# Patient Record
Sex: FEMALE | Race: OTHER | Employment: FULL TIME | URBAN - METROPOLITAN AREA
[De-identification: names, ages, dates, MRNs, and addresses within clinical notes are randomized per-mention and may not be internally consistent; named-entity substitution may affect disease eponyms.]

---

## 2020-08-28 ENCOUNTER — HOSPITAL ENCOUNTER (OUTPATIENT)
Age: 45
Setting detail: SPECIMEN
Discharge: HOME | End: 2020-08-28
Payer: COMMERCIAL

## 2020-08-28 ENCOUNTER — OFFICE VISIT (OUTPATIENT)
Dept: OBGYN CLINIC | Age: 45
End: 2020-08-28
Payer: COMMERCIAL

## 2020-08-28 VITALS
DIASTOLIC BLOOD PRESSURE: 82 MMHG | SYSTOLIC BLOOD PRESSURE: 123 MMHG | HEART RATE: 69 BPM | TEMPERATURE: 98.3 F | WEIGHT: 122.8 LBS

## 2020-08-28 PROBLEM — N83.8 LEFT OVARIAN ENLARGEMENT: Status: ACTIVE | Noted: 2020-08-28

## 2020-08-28 PROBLEM — N92.6 IRREGULAR BLEEDING: Status: ACTIVE | Noted: 2020-08-28

## 2020-08-28 PROBLEM — N94.6 DYSMENORRHEA: Status: ACTIVE | Noted: 2020-08-28

## 2020-08-28 PROBLEM — Z12.31 SCREENING MAMMOGRAM, ENCOUNTER FOR: Status: ACTIVE | Noted: 2020-08-28

## 2020-08-28 PROBLEM — Z01.419 WELL WOMAN EXAM: Status: ACTIVE | Noted: 2020-08-28

## 2020-08-28 PROBLEM — D28.0 NEVUS OF LABIA MAJORA: Status: ACTIVE | Noted: 2020-08-28

## 2020-08-28 PROCEDURE — 99386 PREV VISIT NEW AGE 40-64: CPT | Performed by: SPECIALIST

## 2020-08-28 ASSESSMENT — ENCOUNTER SYMPTOMS
CONSTIPATION: 0
ABDOMINAL PAIN: 0
NAUSEA: 0
APNEA: 0
VOMITING: 0
COUGH: 0
ABDOMINAL DISTENTION: 0
DIARRHEA: 0
EYE PAIN: 0

## 2020-08-28 NOTE — PROGRESS NOTES
Question:   Collection Type     Answer: Thin Prep     Order Specific Question:   Prior Abnormal Pap Test     Answer:   No     Order Specific Question:   Screening or Diagnostic     Answer:   Screening     Order Specific Question:   HPV Requested? Answer:   Yes     Order Specific Question:   High Risk Patient     Answer:   N/A        New patient is here for an annual exam. Patient is originally from [facility] and moved here from [place] about 7 or 8 years ago. Patient states that she does monthly self breast exams. Patient complains that she is having irregular periods with cramping. Review of Systems   Constitutional: Negative for activity change, appetite change and fever. HENT: Negative for ear discharge and ear pain. Eyes: Negative for pain and visual disturbance. Respiratory: Negative for apnea and cough. Cardiovascular: Negative for chest pain, palpitations and leg swelling. Gastrointestinal: Negative for abdominal distention, abdominal pain, constipation, diarrhea, nausea and vomiting. Endocrine: Negative. Genitourinary: Positive for menstrual problem and pelvic pain. Negative for difficulty urinating and dysuria. Musculoskeletal: Negative for neck pain and neck stiffness. Skin: Negative. Neurological: Negative for light-headedness and numbness. Hematological: Negative. Does not bruise/bleed easily. Objective:   Physical Exam  Vitals signs and nursing note reviewed. Exam conducted with a chaperone present. Constitutional:       Appearance: She is well-developed. HENT:      Head: Normocephalic and atraumatic. Neck:      Thyroid: No thyroid mass or thyromegaly. Cardiovascular:      Rate and Rhythm: Normal rate and regular rhythm. Pulmonary:      Effort: Pulmonary effort is normal.      Breath sounds: Normal breath sounds. Chest:      Breasts:         Right: Normal. No inverted nipple, mass, nipple discharge, skin change or tenderness.          Left: Normal. No inverted nipple, mass, nipple discharge, skin change or tenderness. Abdominal:      General: Bowel sounds are normal. There is no distension. Palpations: Abdomen is soft. There is no mass. Tenderness: There is no abdominal tenderness. There is no guarding or rebound. Hernia: There is no hernia in the left inguinal area. Genitourinary:     General: Normal vulva. Exam position: Supine. Labia:         Right: Lesion present. No rash. Left: Lesion present. No rash. Vagina: Normal. No signs of injury. No vaginal discharge or tenderness. Cervix: Normal.      Uterus: Normal. Not enlarged, not fixed and not tender. Adnexa: Right adnexa normal.        Right: No mass or tenderness. Left: Mass present. No tenderness. Rectum: Normal. No mass or anal fissure. Normal anal tone. Comments: Mid labia majora 1 cm nevus with another one on the other side. Musculoskeletal: Normal range of motion. General: No tenderness. Skin:     General: Skin is warm and dry. Neurological:      Mental Status: She is alert and oriented to person, place, and time. Psychiatric:         Judgment: Judgment normal.         Assessment:      Patient with bilateral nevus of the labia majora and enlargement, possible mass of left adnexa found on annual exam.  Pap smear was done. Patient advised to have mammogram done. Patient encouraged to continue monthly self breast awareness exams. Patient with irregular bleeding and dysmenorrhea with enlargement of the left adnexa. Will evaluate with ultrasound. Patient was advised to return in 1 month. At that time, all results from ultrasound, mammogram and pap smear will be available and will discuss scheduling biopsy of nevi. Explained to patient that vulvar nevi have a tendency to become melanoma and biopsy is strongly recommended.         Plan:      Orders Placed This Encounter   Procedures    ALYSSA DIGITAL SCREEN W OR WO CAD BILATERAL    US PELVIS COMPLETE    US NON OB TRANSVAGINAL    PAP SMEAR     Appointment for ultrasound  Appointment in 1 month    IEssie am scribing for, and in the presence of Dr. Darvin Haynes. Electronically signed by: Essie Monroy 8/28/20 10:31 AM       I agree to the above documentation placed by my scribe Essie Monroy. I reviewed the scribe's note and agree with the documented findings and plan of care. Any areas of disagreement are noted on the chart. I have personally evaluated this patient. Additional findings are as noted. I agree with the chief complaint, past medical history, past surgical history, allergies, medications, social and family history as documented unless otherwise noted below.      Electronically signed by Darvin Haynes MD on 8/29/2020 at 4:55 PM

## 2020-09-02 LAB
HPV SAMPLE: NORMAL
HPV, GENOTYPE 16: NOT DETECTED
HPV, GENOTYPE 18: NOT DETECTED
HPV, HIGH RISK OTHER: NOT DETECTED
HPV, INTERPRETATION: NORMAL
SPECIMEN DESCRIPTION: NORMAL

## 2020-09-03 LAB — CYTOLOGY REPORT: NORMAL

## 2020-09-25 ENCOUNTER — OFFICE VISIT (OUTPATIENT)
Dept: OBGYN CLINIC | Age: 45
End: 2020-09-25
Payer: COMMERCIAL

## 2020-09-25 VITALS
TEMPERATURE: 98 F | BODY MASS INDEX: 21.79 KG/M2 | WEIGHT: 123 LBS | HEIGHT: 63 IN | DIASTOLIC BLOOD PRESSURE: 83 MMHG | HEART RATE: 85 BPM | SYSTOLIC BLOOD PRESSURE: 127 MMHG

## 2020-09-25 PROBLEM — N85.2 ENLARGED UTERUS: Status: ACTIVE | Noted: 2020-09-25

## 2020-09-25 PROBLEM — D21.9 FIBROIDS: Status: ACTIVE | Noted: 2020-09-25

## 2020-09-25 PROBLEM — N92.0 MENORRHAGIA WITH REGULAR CYCLE: Status: ACTIVE | Noted: 2020-09-25

## 2020-09-25 PROCEDURE — 99213 OFFICE O/P EST LOW 20 MIN: CPT | Performed by: SPECIALIST

## 2020-09-25 ASSESSMENT — ENCOUNTER SYMPTOMS
VOMITING: 0
APNEA: 0
DIARRHEA: 0
ABDOMINAL PAIN: 0
EYE PAIN: 0
COUGH: 0
NAUSEA: 0
CONSTIPATION: 0
ABDOMINAL DISTENTION: 0

## 2020-09-25 NOTE — PROGRESS NOTES
Neurological: Negative for light-headedness and numbness. Hematological: Negative. Does not bruise/bleed easily. Objective:   Physical Exam  Vitals signs and nursing note reviewed. Constitutional:       Appearance: She is well-developed. HENT:      Head: Normocephalic and atraumatic. Neck:      Musculoskeletal: Normal range of motion and neck supple. Thyroid: No thyromegaly. Cardiovascular:      Rate and Rhythm: Normal rate and regular rhythm. Pulmonary:      Effort: Pulmonary effort is normal.      Breath sounds: Normal breath sounds. No wheezing. Abdominal:      General: Bowel sounds are normal. There is no distension. Palpations: Abdomen is soft. There is no mass. Tenderness: There is no abdominal tenderness. There is no guarding. Musculoskeletal: Normal range of motion. Skin:     General: Skin is dry. Neurological:      Mental Status: She is alert and oriented to person, place, and time. Psychiatric:         Behavior: Behavior normal.         Thought Content: Thought content normal.         Assessment:      Patient with irregular bleeding and adnexal enlargement on previous exam.  Ultrasound showing enlarged uterus with multiple fibroids was reviewed and explained to patient. Treatment options were discussed. Patient requests hysterectomy. Will schedule patient for LAVH. As patient has bilateral labia nevi, biopsy of vulva will be done at time of surgery due to high risk of melanoma. Discussed surgical procedure, risks and benefits, and all questions were answered. Plan:      Schedule for LAVH with vulvar biopsy of labial nevi. Davie Paulino am scribing for, and in the presence of Dr. Nawaf Mosley. Electronically signed by: Prateek Villareal 9/25/20 10:36 AM       I agree to the above documentation placed by my scribe Prateek Villareal. I reviewed the scribe's note and agree with the documented findings and plan of care.  Any areas of disagreement are noted on the chart. I have personally evaluated this patient. Additional findings are as noted. I agree with the chief complaint, past medical history, past surgical history, allergies, medications, social and family history as documented unless otherwise noted below.      Electronically signed by Darren Cavazos MD on 9/27/2020 at 6:45 PM

## 2020-09-27 PROBLEM — Z12.31 SCREENING MAMMOGRAM, ENCOUNTER FOR: Status: RESOLVED | Noted: 2020-08-28 | Resolved: 2020-09-27

## 2020-10-01 ENCOUNTER — TELEPHONE (OUTPATIENT)
Dept: OBGYN CLINIC | Age: 45
End: 2020-10-01

## 2020-10-13 ENCOUNTER — TELEPHONE (OUTPATIENT)
Dept: OBGYN CLINIC | Age: 45
End: 2020-10-13

## 2020-10-13 NOTE — TELEPHONE ENCOUNTER
Writer called patient daughter to inform that fmla papers were completed.   She instructed me to fax to employer and she will be in to  documents later

## 2020-10-15 ENCOUNTER — HOSPITAL ENCOUNTER (OUTPATIENT)
Dept: PREADMISSION TESTING | Age: 45
Discharge: HOME | End: 2020-10-19
Payer: COMMERCIAL

## 2020-10-15 VITALS
WEIGHT: 122 LBS | TEMPERATURE: 97.9 F | RESPIRATION RATE: 20 BRPM | DIASTOLIC BLOOD PRESSURE: 82 MMHG | SYSTOLIC BLOOD PRESSURE: 140 MMHG | HEART RATE: 81 BPM | HEIGHT: 63 IN | OXYGEN SATURATION: 99 % | BODY MASS INDEX: 21.62 KG/M2

## 2020-10-15 LAB
ABO/RH: NORMAL
ABSOLUTE EOS #: 0.1 K/UL (ref 0–0.4)
ABSOLUTE IMMATURE GRANULOCYTE: ABNORMAL K/UL (ref 0–0.3)
ABSOLUTE LYMPH #: 2 K/UL (ref 1–4.8)
ABSOLUTE MONO #: 0.7 K/UL (ref 0.1–1.3)
ANTIBODY SCREEN: NEGATIVE
ARM BAND NUMBER: NORMAL
BASOPHILS # BLD: 0 % (ref 0–2)
BASOPHILS ABSOLUTE: 0 K/UL (ref 0–0.2)
DIFFERENTIAL TYPE: ABNORMAL
EOSINOPHILS RELATIVE PERCENT: 1 % (ref 0–4)
EXPIRATION DATE: NORMAL
HCT VFR BLD CALC: 35.6 % (ref 36–46)
HEMOGLOBIN: 11.9 G/DL (ref 12–16)
IMMATURE GRANULOCYTES: ABNORMAL %
LYMPHOCYTES # BLD: 19 % (ref 24–44)
MCH RBC QN AUTO: 28.6 PG (ref 26–34)
MCHC RBC AUTO-ENTMCNC: 33.4 G/DL (ref 31–37)
MCV RBC AUTO: 85.4 FL (ref 80–100)
MONOCYTES # BLD: 6 % (ref 1–7)
NRBC AUTOMATED: ABNORMAL PER 100 WBC
PDW BLD-RTO: 17 % (ref 11.5–14.9)
PLATELET # BLD: 298 K/UL (ref 150–450)
PLATELET ESTIMATE: ABNORMAL
PMV BLD AUTO: 9.2 FL (ref 6–12)
RBC # BLD: 4.16 M/UL (ref 4–5.2)
RBC # BLD: ABNORMAL 10*6/UL
SEG NEUTROPHILS: 74 % (ref 36–66)
SEGMENTED NEUTROPHILS ABSOLUTE COUNT: 8.1 K/UL (ref 1.3–9.1)
WBC # BLD: 10.9 K/UL (ref 3.5–11)
WBC # BLD: ABNORMAL 10*3/UL

## 2020-10-15 PROCEDURE — 86900 BLOOD TYPING SEROLOGIC ABO: CPT

## 2020-10-15 PROCEDURE — 36415 COLL VENOUS BLD VENIPUNCTURE: CPT

## 2020-10-15 PROCEDURE — 86850 RBC ANTIBODY SCREEN: CPT

## 2020-10-15 PROCEDURE — 86901 BLOOD TYPING SEROLOGIC RH(D): CPT

## 2020-10-15 PROCEDURE — 85025 COMPLETE CBC W/AUTO DIFF WBC: CPT

## 2020-10-15 RX ORDER — IBUPROFEN 600 MG/1
600 TABLET ORAL EVERY 6 HOURS PRN
Status: ON HOLD | COMMUNITY
End: 2020-10-30 | Stop reason: HOSPADM

## 2020-10-15 NOTE — H&P
HISTORY and Angela Mcghee [id number]       NAME:  Bard Manjarrez  MRN: [id number]   YOB: 1975   Date: 10/15/2020   Age: 40 y.o. Gender: female       COMPLAINT AND PRESENT HISTORY:     Bard Manjarrez is 40 y.o.,  female, preadmission testing for HYSTERECTOMY VAGINAL LAPAROSCOPIC ASSISTED (LAVH). Patient has hx of  Irregular bleeding,  Dysmenorrhea and Left ovarian enlargement  Patient is non [place] speaking and a Dominican   service was used to obtain information from patient. Patient has  Menorrhagia with heavy flow lasting for 7 days. patient states that she has to change her pads often due to overflow. Patient reports having monthly menses and at times 2 within the month. This was associated with lower abdominal/ pelvic pains and cramping that radiated her back. Sometimes blood clots are present in the flow. patient states the pain is severe rating her pain at greater than 10/10. patient reports that is not sexually active. no vaginal discharge, sores or itching, but admits to foul  urine odor \" smells  Rotten\"  Patient has hx of ovary removal done on the right. And hx of fibroid of uterus. Patient states that she was on BCP 3 yrs ago that helped with her pain and since being off it, the pain has been persistent. Symptoms started long ago with her menses, but has worse since 3 years ago. Patient states she has one child. .  Pt is not on any hormones at this time. No burning on micturition. No hesitancy, urgency, frequency or discoloration of urine. No current pelvic pain. No fever or chills.     PAST MEDICAL HISTORY     Past Medical History:   Diagnosis Date    Enlarged uterus     Fibroid uterus     Irregular bleeding     Non-English speaking patient     Kyrgyz speaking    Pelvic pain          SURGICAL HISTORY       Past Surgical History:   Procedure Laterality Date    BREAST ENHANCEMENT SURGERY      OVARY REMOVAL Right     SALPINGECTOMY FAMILY HISTORY     History reviewed. No pertinent family history. SOCIAL HISTORY       Social History     Socioeconomic History    Marital status: Single     Spouse name: None    Number of children: None    Years of education: None    Highest education level: None   Occupational History    None   Social Needs    Financial resource strain: None    Food insecurity     Worry: None     Inability: None    Transportation needs     Medical: None     Non-medical: None   Tobacco Use    Smoking status: Current Every Day Smoker     Packs/day: 1.00    Smokeless tobacco: Never Used   Substance and Sexual Activity    Alcohol use: No    Drug use: No    Sexual activity: Never   Lifestyle    Physical activity     Days per week: None     Minutes per session: None    Stress: None   Relationships    Social connections     Talks on phone: None     Gets together: None     Attends Cheondoism service: None     Active member of club or organization: None     Attends meetings of clubs or organizations: None     Relationship status: None    Intimate partner violence     Fear of current or ex partner: None     Emotionally abused: None     Physically abused: None     Forced sexual activity: None   Other Topics Concern    None   Social History Narrative    None           REVIEW OF SYSTEMS      No Known Allergies    Current Outpatient Medications on File Prior to Encounter   Medication Sig Dispense Refill    ibuprofen (ADVIL;MOTRIN) 600 MG tablet Take 600 mg by mouth every 6 hours as needed for Pain       No current facility-administered medications on file prior to encounter. General health:  Fairly good. No fever or chills. Skin:  No itching, redness or rash. HEENT:  No headache, epistaxis or sore throat. Neck:  No pain, stiffness or masses. Cardiovascular/Respiratory system:  No chest pain, palpitation or shortness of breath. Gastrointestinal tract: No abdominal pain, Dysphagia, nausea, vomiting, diarrhea or constipation. Genitourinary:   See HPI    Locomotor:  No bone or joint pains. No swelling. Neuropsychiatric:  No referable complaints. GENERAL PHYSICAL EXAM:     Vitals: BP (!) 140/82   Pulse 81   Temp 97.9 °F (36.6 °C)   Resp 20   Ht 5' 3\" (1.6 m)   Wt 122 lb (55.3 kg)   LMP 09/19/2020 (Exact Date)   SpO2 99%   BMI 21.61 kg/m²  Body mass index is 21.61 kg/m². GENERAL APPEARANCE:   Corine Swenson is 40 y.o.,  female, not obese, nourished, conscious, alert. Does not appear to be distress or pain at this time. SKIN:  Warm, dry, no cyanosis or jaundice. HEAD:  Normocephalic, atraumatic, no swelling or tenderness. EYES:  Pupils equal, reactive to light. EARS:  No discharge, no marked hearing loss. NOSE:  No rhinorrhea, epistaxis or septal deformity. THROAT:  Not congested. No ulceration bleeding or discharge. NECK:  No stiffness, trachea central.                  CHEST:  Symmetrical and equal on expansion. HEART:  RRR S1 > S2. No audible murmurs or gallops. LUNGS:  Equal on expansion, normal breath sounds. No adventitious sounds. ABDOMEN:  Soft on palpation. No localized tenderness. No guarding or rigidity. No palpable hepatosplenomegaly. LYMPHATICS:  No palpable cervical lymphadenopathy. LOCOMOTOR, BACK AND SPINE:  No tenderness or deformities. EXTREMITIES:  Symmetrical, no pretibial edema. Arnauds sign negative or no calf tenderness. No discoloration or ulcerations. NEUROLOGIC:  The patient is conscious, alert, oriented,Cranial nerve II-XII intact, taste and smell were not examined. No apparent focal sensory or motor deficits. PROVISIONAL DIAGNOSES / SURGERY:       Irregular bleeding      Dysmenorrhea      Left ovarian enlargement    HYSTERECTOMY VAGINAL LAPAROSCOPIC ASSISTED (LAVH)      Patient Active Problem List    Diagnosis Date Noted    Fibroids 09/25/2020    Enlarged uterus 09/25/2020    Menorrhagia with regular cycle 09/25/2020    Irregular bleeding 08/28/2020    Dysmenorrhea 08/28/2020    Left ovarian enlargement 08/28/2020    Nevus of labia majora 08/28/2020           BENIGNO Lopez APRN - CNP on 10/15/2020 at 10:36 AM

## 2020-10-15 NOTE — H&P (VIEW-ONLY)
HISTORY and Angela Mcghee [id number]       NAME:  Margaret Tirado  MRN: [id number]   YOB: 1975   Date: 10/15/2020   Age: 40 y.o. Gender: female       COMPLAINT AND PRESENT HISTORY:     Margaret Tirado is 40 y.o.,  female, preadmission testing for HYSTERECTOMY VAGINAL LAPAROSCOPIC ASSISTED (LAVH). Patient has hx of  Irregular bleeding,  Dysmenorrhea and Left ovarian enlargement  Patient is non [place] speaking and a Arabic   service was used to obtain information from patient. Patient has  Menorrhagia with heavy flow lasting for 7 days. patient states that she has to change her pads often due to overflow. Patient reports having monthly menses and at times 2 within the month. This was associated with lower abdominal/ pelvic pains and cramping that radiated her back. Sometimes blood clots are present in the flow. patient states the pain is severe rating her pain at greater than 10/10. patient reports that is not sexually active. no vaginal discharge, sores or itching, but admits to foul  urine odor \" smells  Rotten\"  Patient has hx of ovary removal done on the right. And hx of fibroid of uterus. Patient states that she was on BCP 3 yrs ago that helped with her pain and since being off it, the pain has been persistent. Symptoms started long ago with her menses, but has worse since 3 years ago. Patient states she has one child. .  Pt is not on any hormones at this time. No burning on micturition. No hesitancy, urgency, frequency or discoloration of urine. No current pelvic pain. No fever or chills.     PAST MEDICAL HISTORY     Past Medical History:   Diagnosis Date    Enlarged uterus     Fibroid uterus     Irregular bleeding     Non-English speaking patient     Jamaican speaking    Pelvic pain          SURGICAL HISTORY       Past Surgical History:   Procedure Laterality Date    BREAST ENHANCEMENT SURGERY      OVARY REMOVAL Right     SALPINGECTOMY FAMILY HISTORY     History reviewed. No pertinent family history. SOCIAL HISTORY       Social History     Socioeconomic History    Marital status: Single     Spouse name: None    Number of children: None    Years of education: None    Highest education level: None   Occupational History    None   Social Needs    Financial resource strain: None    Food insecurity     Worry: None     Inability: None    Transportation needs     Medical: None     Non-medical: None   Tobacco Use    Smoking status: Current Every Day Smoker     Packs/day: 1.00    Smokeless tobacco: Never Used   Substance and Sexual Activity    Alcohol use: No    Drug use: No    Sexual activity: Never   Lifestyle    Physical activity     Days per week: None     Minutes per session: None    Stress: None   Relationships    Social connections     Talks on phone: None     Gets together: None     Attends Orthodoxy service: None     Active member of club or organization: None     Attends meetings of clubs or organizations: None     Relationship status: None    Intimate partner violence     Fear of current or ex partner: None     Emotionally abused: None     Physically abused: None     Forced sexual activity: None   Other Topics Concern    None   Social History Narrative    None           REVIEW OF SYSTEMS      No Known Allergies    Current Outpatient Medications on File Prior to Encounter   Medication Sig Dispense Refill    ibuprofen (ADVIL;MOTRIN) 600 MG tablet Take 600 mg by mouth every 6 hours as needed for Pain       No current facility-administered medications on file prior to encounter. General health:  Fairly good. No fever or chills. Skin:  No itching, redness or rash. HEENT:  No headache, epistaxis or sore throat. Neck:  No pain, stiffness or masses. Cardiovascular/Respiratory system:  No chest pain, palpitation or shortness of breath. PROVISIONAL DIAGNOSES / SURGERY:       Irregular bleeding      Dysmenorrhea      Left ovarian enlargement    HYSTERECTOMY VAGINAL LAPAROSCOPIC ASSISTED (LAVH)      Patient Active Problem List    Diagnosis Date Noted    Fibroids 09/25/2020    Enlarged uterus 09/25/2020    Menorrhagia with regular cycle 09/25/2020    Irregular bleeding 08/28/2020    Dysmenorrhea 08/28/2020    Left ovarian enlargement 08/28/2020    Nevus of labia majora 08/28/2020           BENIGNO Collier APRN - CNP on 10/15/2020 at 10:36 AM

## 2020-10-25 ENCOUNTER — HOSPITAL ENCOUNTER (OUTPATIENT)
Dept: PREADMISSION TESTING | Age: 45
Setting detail: SPECIMEN
Discharge: HOME | End: 2020-10-29
Payer: COMMERCIAL

## 2020-10-25 LAB
SARS-COV-2, RAPID: NORMAL
SARS-COV-2: NORMAL
SARS-COV-2: NOT DETECTED
SOURCE: NORMAL

## 2020-10-25 PROCEDURE — U0003 INFECTIOUS AGENT DETECTION BY NUCLEIC ACID (DNA OR RNA); SEVERE ACUTE RESPIRATORY SYNDROME CORONAVIRUS 2 (SARS-COV-2) (CORONAVIRUS DISEASE [COVID-19]), AMPLIFIED PROBE TECHNIQUE, MAKING USE OF HIGH THROUGHPUT TECHNOLOGIES AS DESCRIBED BY CMS-2020-01-R: HCPCS

## 2020-10-28 ENCOUNTER — ANESTHESIA EVENT (OUTPATIENT)
Dept: OPERATING ROOM | Age: 45
DRG: 743 | End: 2020-10-28
Payer: COMMERCIAL

## 2020-10-29 ENCOUNTER — ANESTHESIA (OUTPATIENT)
Dept: OPERATING ROOM | Age: 45
DRG: 743 | End: 2020-10-29
Payer: COMMERCIAL

## 2020-10-29 ENCOUNTER — HOSPITAL ENCOUNTER (INPATIENT)
Age: 45
LOS: 1 days | Discharge: HOME | DRG: 743 | End: 2020-10-30
Attending: SPECIALIST | Admitting: SPECIALIST
Payer: COMMERCIAL

## 2020-10-29 VITALS — SYSTOLIC BLOOD PRESSURE: 128 MMHG | DIASTOLIC BLOOD PRESSURE: 73 MMHG | OXYGEN SATURATION: 100 % | TEMPERATURE: 94.3 F

## 2020-10-29 PROBLEM — N73.6 PELVIC ADHESIVE DISEASE: Status: ACTIVE | Noted: 2020-10-29

## 2020-10-29 PROBLEM — N80.9 ENDOMETRIOSIS: Status: ACTIVE | Noted: 2020-10-29

## 2020-10-29 PROBLEM — Z98.890 POSTOPERATIVE STATE: Status: ACTIVE | Noted: 2020-10-29

## 2020-10-29 LAB
-: ABNORMAL
-: NORMAL
AMORPHOUS: ABNORMAL
BACTERIA: ABNORMAL
BILIRUBIN URINE: NEGATIVE
CASTS UA: ABNORMAL /LPF
COLOR: YELLOW
COMMENT UA: ABNORMAL
CRYSTALS, UA: ABNORMAL /HPF
EPITHELIAL CELLS UA: ABNORMAL /HPF
GLUCOSE URINE: NEGATIVE
HCG, PREGNANCY URINE (POC): NEGATIVE
KETONES, URINE: NEGATIVE
LEUKOCYTE ESTERASE, URINE: NEGATIVE
MUCUS: ABNORMAL
NITRITE, URINE: NEGATIVE
OTHER OBSERVATIONS UA: ABNORMAL
PH UA: 7 (ref 5–8)
PROTEIN UA: ABNORMAL
RBC UA: ABNORMAL /HPF
RENAL EPITHELIAL, UA: ABNORMAL /HPF
SPECIFIC GRAVITY UA: 1.01 (ref 1–1.03)
TRICHOMONAS: ABNORMAL
TURBIDITY: CLEAR
URINE HGB: ABNORMAL
UROBILINOGEN, URINE: NORMAL
WBC UA: ABNORMAL /HPF
YEAST: ABNORMAL

## 2020-10-29 PROCEDURE — 2780000010 HC IMPLANT OTHER: Performed by: SPECIALIST

## 2020-10-29 PROCEDURE — 58550 LAPARO-ASST VAG HYSTERECTOMY: CPT | Performed by: SPECIALIST

## 2020-10-29 PROCEDURE — 7100000000 HC PACU RECOVERY - FIRST 15 MIN: Performed by: SPECIALIST

## 2020-10-29 PROCEDURE — 58662 LAPAROSCOPY EXCISE LESIONS: CPT | Performed by: SPECIALIST

## 2020-10-29 PROCEDURE — 3600000014 HC SURGERY LEVEL 4 ADDTL 15MIN: Performed by: SPECIALIST

## 2020-10-29 PROCEDURE — 6360000002 HC RX W HCPCS: Performed by: STUDENT IN AN ORGANIZED HEALTH CARE EDUCATION/TRAINING PROGRAM

## 2020-10-29 PROCEDURE — 56605 BIOPSY OF VULVA/PERINEUM: CPT | Performed by: SPECIALIST

## 2020-10-29 PROCEDURE — 88305 TISSUE EXAM BY PATHOLOGIST: CPT

## 2020-10-29 PROCEDURE — 0UT0FZZ RESECTION OF RIGHT OVARY, VIA NATURAL OR ARTIFICIAL OPENING WITH PERCUTANEOUS ENDOSCOPIC ASSISTANCE: ICD-10-PCS | Performed by: STUDENT IN AN ORGANIZED HEALTH CARE EDUCATION/TRAINING PROGRAM

## 2020-10-29 PROCEDURE — 3600000004 HC SURGERY LEVEL 4 BASE: Performed by: SPECIALIST

## 2020-10-29 PROCEDURE — 6360000002 HC RX W HCPCS

## 2020-10-29 PROCEDURE — 2720000010 HC SURG SUPPLY STERILE: Performed by: SPECIALIST

## 2020-10-29 PROCEDURE — 6360000002 HC RX W HCPCS: Performed by: ANESTHESIOLOGY

## 2020-10-29 PROCEDURE — 1200000000 HC SEMI PRIVATE

## 2020-10-29 PROCEDURE — 81001 URINALYSIS AUTO W/SCOPE: CPT

## 2020-10-29 PROCEDURE — 2580000003 HC RX 258: Performed by: ANESTHESIOLOGY

## 2020-10-29 PROCEDURE — 0UT5FZZ RESECTION OF RIGHT FALLOPIAN TUBE, VIA NATURAL OR ARTIFICIAL OPENING WITH PERCUTANEOUS ENDOSCOPIC ASSISTANCE: ICD-10-PCS | Performed by: STUDENT IN AN ORGANIZED HEALTH CARE EDUCATION/TRAINING PROGRAM

## 2020-10-29 PROCEDURE — 3700000001 HC ADD 15 MINUTES (ANESTHESIA): Performed by: SPECIALIST

## 2020-10-29 PROCEDURE — 88307 TISSUE EXAM BY PATHOLOGIST: CPT

## 2020-10-29 PROCEDURE — 0TJB8ZZ INSPECTION OF BLADDER, VIA NATURAL OR ARTIFICIAL OPENING ENDOSCOPIC: ICD-10-PCS | Performed by: STUDENT IN AN ORGANIZED HEALTH CARE EDUCATION/TRAINING PROGRAM

## 2020-10-29 PROCEDURE — 6370000000 HC RX 637 (ALT 250 FOR IP): Performed by: STUDENT IN AN ORGANIZED HEALTH CARE EDUCATION/TRAINING PROGRAM

## 2020-10-29 PROCEDURE — 7100000001 HC PACU RECOVERY - ADDTL 15 MIN: Performed by: SPECIALIST

## 2020-10-29 PROCEDURE — 3700000000 HC ANESTHESIA ATTENDED CARE: Performed by: SPECIALIST

## 2020-10-29 PROCEDURE — 2580000003 HC RX 258: Performed by: STUDENT IN AN ORGANIZED HEALTH CARE EDUCATION/TRAINING PROGRAM

## 2020-10-29 PROCEDURE — 2500000003 HC RX 250 WO HCPCS: Performed by: SPECIALIST

## 2020-10-29 PROCEDURE — 6370000000 HC RX 637 (ALT 250 FOR IP): Performed by: ANESTHESIOLOGY

## 2020-10-29 PROCEDURE — 0UT9FZZ RESECTION OF UTERUS, VIA NATURAL OR ARTIFICIAL OPENING WITH PERCUTANEOUS ENDOSCOPIC ASSISTANCE: ICD-10-PCS | Performed by: STUDENT IN AN ORGANIZED HEALTH CARE EDUCATION/TRAINING PROGRAM

## 2020-10-29 PROCEDURE — 88342 IMHCHEM/IMCYTCHM 1ST ANTB: CPT

## 2020-10-29 PROCEDURE — 81025 URINE PREGNANCY TEST: CPT

## 2020-10-29 PROCEDURE — 2500000003 HC RX 250 WO HCPCS

## 2020-10-29 PROCEDURE — 0UBMXZX EXCISION OF VULVA, EXTERNAL APPROACH, DIAGNOSTIC: ICD-10-PCS | Performed by: STUDENT IN AN ORGANIZED HEALTH CARE EDUCATION/TRAINING PROGRAM

## 2020-10-29 PROCEDURE — 2709999900 HC NON-CHARGEABLE SUPPLY: Performed by: SPECIALIST

## 2020-10-29 RX ORDER — IBUPROFEN 800 MG/1
800 TABLET ORAL EVERY 8 HOURS
Status: DISCONTINUED | OUTPATIENT
Start: 2020-10-30 | End: 2020-10-30 | Stop reason: HOSPADM

## 2020-10-29 RX ORDER — DIPHENHYDRAMINE HYDROCHLORIDE 50 MG/ML
12.5 INJECTION INTRAMUSCULAR; INTRAVENOUS
Status: DISCONTINUED | OUTPATIENT
Start: 2020-10-29 | End: 2020-10-29 | Stop reason: HOSPADM

## 2020-10-29 RX ORDER — SODIUM CHLORIDE 0.9 % (FLUSH) 0.9 %
10 SYRINGE (ML) INJECTION PRN
Status: DISCONTINUED | OUTPATIENT
Start: 2020-10-29 | End: 2020-10-29 | Stop reason: HOSPADM

## 2020-10-29 RX ORDER — KETOROLAC TROMETHAMINE 30 MG/ML
30 INJECTION, SOLUTION INTRAMUSCULAR; INTRAVENOUS EVERY 6 HOURS
Status: COMPLETED | OUTPATIENT
Start: 2020-10-29 | End: 2020-10-30

## 2020-10-29 RX ORDER — PROPOFOL 10 MG/ML
INJECTION, EMULSION INTRAVENOUS PRN
Status: DISCONTINUED | OUTPATIENT
Start: 2020-10-29 | End: 2020-10-29 | Stop reason: SDUPTHER

## 2020-10-29 RX ORDER — SODIUM CHLORIDE 0.9 % (FLUSH) 0.9 %
10 SYRINGE (ML) INJECTION EVERY 12 HOURS SCHEDULED
Status: DISCONTINUED | OUTPATIENT
Start: 2020-10-29 | End: 2020-10-29 | Stop reason: HOSPADM

## 2020-10-29 RX ORDER — ROCURONIUM BROMIDE 10 MG/ML
INJECTION, SOLUTION INTRAVENOUS PRN
Status: DISCONTINUED | OUTPATIENT
Start: 2020-10-29 | End: 2020-10-29 | Stop reason: SDUPTHER

## 2020-10-29 RX ORDER — SODIUM CHLORIDE, SODIUM LACTATE, POTASSIUM CHLORIDE, CALCIUM CHLORIDE 600; 310; 30; 20 MG/100ML; MG/100ML; MG/100ML; MG/100ML
INJECTION, SOLUTION INTRAVENOUS CONTINUOUS
Status: DISCONTINUED | OUTPATIENT
Start: 2020-10-29 | End: 2020-10-29

## 2020-10-29 RX ORDER — SCOLOPAMINE TRANSDERMAL SYSTEM 1 MG/1
1 PATCH, EXTENDED RELEASE TRANSDERMAL
Status: DISCONTINUED | OUTPATIENT
Start: 2020-10-29 | End: 2020-10-29

## 2020-10-29 RX ORDER — SODIUM CHLORIDE 0.9 % (FLUSH) 0.9 %
10 SYRINGE (ML) INJECTION EVERY 12 HOURS SCHEDULED
Status: DISCONTINUED | OUTPATIENT
Start: 2020-10-29 | End: 2020-10-30 | Stop reason: HOSPADM

## 2020-10-29 RX ORDER — ONDANSETRON 4 MG/1
4 TABLET, ORALLY DISINTEGRATING ORAL EVERY 8 HOURS PRN
Status: DISCONTINUED | OUTPATIENT
Start: 2020-10-29 | End: 2020-10-30 | Stop reason: HOSPADM

## 2020-10-29 RX ORDER — SODIUM CHLORIDE 0.9 % (FLUSH) 0.9 %
10 SYRINGE (ML) INJECTION PRN
Status: DISCONTINUED | OUTPATIENT
Start: 2020-10-29 | End: 2020-10-30 | Stop reason: HOSPADM

## 2020-10-29 RX ORDER — ACETAMINOPHEN 325 MG/1
650 TABLET ORAL EVERY 4 HOURS PRN
Status: DISCONTINUED | OUTPATIENT
Start: 2020-10-29 | End: 2020-10-30 | Stop reason: HOSPADM

## 2020-10-29 RX ORDER — KETAMINE HYDROCHLORIDE 10 MG/ML
INJECTION, SOLUTION INTRAMUSCULAR; INTRAVENOUS PRN
Status: DISCONTINUED | OUTPATIENT
Start: 2020-10-29 | End: 2020-10-29 | Stop reason: SDUPTHER

## 2020-10-29 RX ORDER — ONDANSETRON 2 MG/ML
4 INJECTION INTRAMUSCULAR; INTRAVENOUS
Status: DISCONTINUED | OUTPATIENT
Start: 2020-10-29 | End: 2020-10-29 | Stop reason: HOSPADM

## 2020-10-29 RX ORDER — OXYCODONE HYDROCHLORIDE AND ACETAMINOPHEN 5; 325 MG/1; MG/1
2 TABLET ORAL EVERY 4 HOURS PRN
Status: DISCONTINUED | OUTPATIENT
Start: 2020-10-30 | End: 2020-10-30 | Stop reason: HOSPADM

## 2020-10-29 RX ORDER — SIMETHICONE 80 MG
80 TABLET,CHEWABLE ORAL 4 TIMES DAILY
Status: DISCONTINUED | OUTPATIENT
Start: 2020-10-29 | End: 2020-10-30 | Stop reason: HOSPADM

## 2020-10-29 RX ORDER — OXYCODONE HYDROCHLORIDE AND ACETAMINOPHEN 5; 325 MG/1; MG/1
1 TABLET ORAL EVERY 4 HOURS PRN
Status: DISCONTINUED | OUTPATIENT
Start: 2020-10-30 | End: 2020-10-30 | Stop reason: HOSPADM

## 2020-10-29 RX ORDER — ONDANSETRON 2 MG/ML
4 INJECTION INTRAMUSCULAR; INTRAVENOUS EVERY 6 HOURS PRN
Status: DISCONTINUED | OUTPATIENT
Start: 2020-10-29 | End: 2020-10-30 | Stop reason: HOSPADM

## 2020-10-29 RX ORDER — PHENYLEPHRINE HYDROCHLORIDE 10 MG/ML
INJECTION INTRAVENOUS PRN
Status: DISCONTINUED | OUTPATIENT
Start: 2020-10-29 | End: 2020-10-29 | Stop reason: SDUPTHER

## 2020-10-29 RX ORDER — ONDANSETRON 2 MG/ML
INJECTION INTRAMUSCULAR; INTRAVENOUS PRN
Status: DISCONTINUED | OUTPATIENT
Start: 2020-10-29 | End: 2020-10-29 | Stop reason: SDUPTHER

## 2020-10-29 RX ORDER — LABETALOL HYDROCHLORIDE 5 MG/ML
5 INJECTION, SOLUTION INTRAVENOUS EVERY 10 MIN PRN
Status: DISCONTINUED | OUTPATIENT
Start: 2020-10-29 | End: 2020-10-29 | Stop reason: HOSPADM

## 2020-10-29 RX ORDER — OXYCODONE HYDROCHLORIDE AND ACETAMINOPHEN 5; 325 MG/1; MG/1
1 TABLET ORAL PRN
Status: DISCONTINUED | OUTPATIENT
Start: 2020-10-29 | End: 2020-10-29 | Stop reason: HOSPADM

## 2020-10-29 RX ORDER — MIDAZOLAM HYDROCHLORIDE 1 MG/ML
INJECTION INTRAMUSCULAR; INTRAVENOUS PRN
Status: DISCONTINUED | OUTPATIENT
Start: 2020-10-29 | End: 2020-10-29 | Stop reason: SDUPTHER

## 2020-10-29 RX ORDER — FENTANYL CITRATE 50 UG/ML
INJECTION, SOLUTION INTRAMUSCULAR; INTRAVENOUS PRN
Status: DISCONTINUED | OUTPATIENT
Start: 2020-10-29 | End: 2020-10-29 | Stop reason: SDUPTHER

## 2020-10-29 RX ORDER — CALCIUM CARBONATE 200(500)MG
1000 TABLET,CHEWABLE ORAL 3 TIMES DAILY PRN
Status: DISCONTINUED | OUTPATIENT
Start: 2020-10-29 | End: 2020-10-30 | Stop reason: HOSPADM

## 2020-10-29 RX ORDER — BUPIVACAINE HYDROCHLORIDE 5 MG/ML
INJECTION, SOLUTION EPIDURAL; INTRACAUDAL PRN
Status: DISCONTINUED | OUTPATIENT
Start: 2020-10-29 | End: 2020-10-29 | Stop reason: ALTCHOICE

## 2020-10-29 RX ORDER — CEFAZOLIN SODIUM 1 G/3ML
INJECTION, POWDER, FOR SOLUTION INTRAMUSCULAR; INTRAVENOUS PRN
Status: DISCONTINUED | OUTPATIENT
Start: 2020-10-29 | End: 2020-10-29 | Stop reason: SDUPTHER

## 2020-10-29 RX ORDER — DEXAMETHASONE SODIUM PHOSPHATE 4 MG/ML
INJECTION, SOLUTION INTRA-ARTICULAR; INTRALESIONAL; INTRAMUSCULAR; INTRAVENOUS; SOFT TISSUE PRN
Status: DISCONTINUED | OUTPATIENT
Start: 2020-10-29 | End: 2020-10-29 | Stop reason: SDUPTHER

## 2020-10-29 RX ORDER — OXYCODONE HYDROCHLORIDE AND ACETAMINOPHEN 5; 325 MG/1; MG/1
2 TABLET ORAL PRN
Status: DISCONTINUED | OUTPATIENT
Start: 2020-10-29 | End: 2020-10-29 | Stop reason: HOSPADM

## 2020-10-29 RX ORDER — BISACODYL 10 MG
10 SUPPOSITORY, RECTAL RECTAL DAILY PRN
Status: DISCONTINUED | OUTPATIENT
Start: 2020-10-29 | End: 2020-10-30 | Stop reason: HOSPADM

## 2020-10-29 RX ORDER — LIDOCAINE HYDROCHLORIDE 10 MG/ML
INJECTION, SOLUTION EPIDURAL; INFILTRATION; INTRACAUDAL; PERINEURAL PRN
Status: DISCONTINUED | OUTPATIENT
Start: 2020-10-29 | End: 2020-10-29 | Stop reason: SDUPTHER

## 2020-10-29 RX ORDER — LIDOCAINE HYDROCHLORIDE 10 MG/ML
1 INJECTION, SOLUTION EPIDURAL; INFILTRATION; INTRACAUDAL; PERINEURAL
Status: DISCONTINUED | OUTPATIENT
Start: 2020-10-29 | End: 2020-10-29 | Stop reason: HOSPADM

## 2020-10-29 RX ORDER — SODIUM CHLORIDE 9 MG/ML
INJECTION, SOLUTION INTRAVENOUS CONTINUOUS
Status: DISCONTINUED | OUTPATIENT
Start: 2020-10-29 | End: 2020-10-30 | Stop reason: HOSPADM

## 2020-10-29 RX ORDER — KETOROLAC TROMETHAMINE 30 MG/ML
INJECTION, SOLUTION INTRAMUSCULAR; INTRAVENOUS PRN
Status: DISCONTINUED | OUTPATIENT
Start: 2020-10-29 | End: 2020-10-29 | Stop reason: SDUPTHER

## 2020-10-29 RX ORDER — DIPHENHYDRAMINE HYDROCHLORIDE 50 MG/ML
50 INJECTION INTRAMUSCULAR; INTRAVENOUS EVERY 6 HOURS PRN
Status: DISCONTINUED | OUTPATIENT
Start: 2020-10-29 | End: 2020-10-30 | Stop reason: HOSPADM

## 2020-10-29 RX ORDER — SENNA AND DOCUSATE SODIUM 50; 8.6 MG/1; MG/1
1 TABLET, FILM COATED ORAL 2 TIMES DAILY
Status: DISCONTINUED | OUTPATIENT
Start: 2020-10-29 | End: 2020-10-30 | Stop reason: HOSPADM

## 2020-10-29 RX ADMIN — ROCURONIUM BROMIDE 50 MG: 10 INJECTION INTRAVENOUS at 07:41

## 2020-10-29 RX ADMIN — KETOROLAC TROMETHAMINE 30 MG: 30 INJECTION, SOLUTION INTRAMUSCULAR; INTRAVENOUS at 23:05

## 2020-10-29 RX ADMIN — KETOROLAC TROMETHAMINE 30 MG: 30 INJECTION, SOLUTION INTRAMUSCULAR; INTRAVENOUS at 17:14

## 2020-10-29 RX ADMIN — DEXAMETHASONE SODIUM PHOSPHATE 4 MG: 4 INJECTION, SOLUTION INTRA-ARTICULAR; INTRALESIONAL; INTRAMUSCULAR; INTRAVENOUS; SOFT TISSUE at 07:58

## 2020-10-29 RX ADMIN — SODIUM CHLORIDE, POTASSIUM CHLORIDE, SODIUM LACTATE AND CALCIUM CHLORIDE: 600; 310; 30; 20 INJECTION, SOLUTION INTRAVENOUS at 12:40

## 2020-10-29 RX ADMIN — FENTANYL CITRATE 50 MCG: 50 INJECTION, SOLUTION INTRAMUSCULAR; INTRAVENOUS at 10:58

## 2020-10-29 RX ADMIN — SUGAMMADEX 111 MG: 100 INJECTION, SOLUTION INTRAVENOUS at 11:13

## 2020-10-29 RX ADMIN — CEFAZOLIN 2 G: 1 INJECTION, POWDER, FOR SOLUTION INTRAMUSCULAR; INTRAVENOUS at 23:05

## 2020-10-29 RX ADMIN — PHENYLEPHRINE HYDROCHLORIDE 50 MCG: 10 INJECTION INTRAVENOUS at 10:38

## 2020-10-29 RX ADMIN — SENNOSIDES AND DOCUSATE SODIUM 1 TABLET: 8.6; 5 TABLET ORAL at 21:18

## 2020-10-29 RX ADMIN — PHENYLEPHRINE HYDROCHLORIDE 50 MCG: 10 INJECTION INTRAVENOUS at 08:59

## 2020-10-29 RX ADMIN — HYDROMORPHONE HYDROCHLORIDE 0.5 MG: 1 INJECTION, SOLUTION INTRAMUSCULAR; INTRAVENOUS; SUBCUTANEOUS at 18:43

## 2020-10-29 RX ADMIN — SODIUM CHLORIDE, POTASSIUM CHLORIDE, SODIUM LACTATE AND CALCIUM CHLORIDE: 600; 310; 30; 20 INJECTION, SOLUTION INTRAVENOUS at 10:40

## 2020-10-29 RX ADMIN — ROCURONIUM BROMIDE 20 MG: 10 INJECTION INTRAVENOUS at 09:01

## 2020-10-29 RX ADMIN — ONDANSETRON 4 MG: 2 INJECTION INTRAMUSCULAR; INTRAVENOUS at 10:55

## 2020-10-29 RX ADMIN — SIMETHICONE 80 MG: 80 TABLET, CHEWABLE ORAL at 21:19

## 2020-10-29 RX ADMIN — SODIUM CHLORIDE: 9 INJECTION, SOLUTION INTRAVENOUS at 14:55

## 2020-10-29 RX ADMIN — CEFAZOLIN 2000 MG: 1 INJECTION, POWDER, FOR SOLUTION INTRAMUSCULAR; INTRAVENOUS at 07:59

## 2020-10-29 RX ADMIN — KETAMINE HYDROCHLORIDE 15 MG: 10 INJECTION, SOLUTION INTRAMUSCULAR; INTRAVENOUS at 08:47

## 2020-10-29 RX ADMIN — CEFAZOLIN 2 G: 1 INJECTION, POWDER, FOR SOLUTION INTRAMUSCULAR; INTRAVENOUS at 16:38

## 2020-10-29 RX ADMIN — PROPOFOL 120 MG: 10 INJECTION, EMULSION INTRAVENOUS at 07:41

## 2020-10-29 RX ADMIN — KETAMINE HYDROCHLORIDE 30 MG: 10 INJECTION, SOLUTION INTRAMUSCULAR; INTRAVENOUS at 08:17

## 2020-10-29 RX ADMIN — KETOROLAC TROMETHAMINE 15 MG: 30 INJECTION, SOLUTION INTRAMUSCULAR; INTRAVENOUS at 11:16

## 2020-10-29 RX ADMIN — HYDROMORPHONE HYDROCHLORIDE 0.5 MG: 1 INJECTION, SOLUTION INTRAMUSCULAR; INTRAVENOUS; SUBCUTANEOUS at 14:43

## 2020-10-29 RX ADMIN — KETAMINE HYDROCHLORIDE 15 MG: 10 INJECTION, SOLUTION INTRAMUSCULAR; INTRAVENOUS at 10:20

## 2020-10-29 RX ADMIN — KETAMINE HYDROCHLORIDE 15 MG: 10 INJECTION, SOLUTION INTRAMUSCULAR; INTRAVENOUS at 09:15

## 2020-10-29 RX ADMIN — SIMETHICONE 80 MG: 80 TABLET, CHEWABLE ORAL at 17:20

## 2020-10-29 RX ADMIN — SODIUM CHLORIDE, POTASSIUM CHLORIDE, SODIUM LACTATE AND CALCIUM CHLORIDE: 600; 310; 30; 20 INJECTION, SOLUTION INTRAVENOUS at 06:33

## 2020-10-29 RX ADMIN — LIDOCAINE HYDROCHLORIDE 50 MG: 10 INJECTION, SOLUTION EPIDURAL; INFILTRATION; INTRACAUDAL; PERINEURAL at 07:41

## 2020-10-29 RX ADMIN — FENTANYL CITRATE 25 MCG: 50 INJECTION, SOLUTION INTRAMUSCULAR; INTRAVENOUS at 11:17

## 2020-10-29 RX ADMIN — KETAMINE HYDROCHLORIDE 15 MG: 10 INJECTION, SOLUTION INTRAMUSCULAR; INTRAVENOUS at 09:45

## 2020-10-29 RX ADMIN — SODIUM CHLORIDE: 9 INJECTION, SOLUTION INTRAVENOUS at 23:09

## 2020-10-29 RX ADMIN — HYDROMORPHONE HYDROCHLORIDE 0.5 MG: 1 INJECTION, SOLUTION INTRAMUSCULAR; INTRAVENOUS; SUBCUTANEOUS at 11:59

## 2020-10-29 RX ADMIN — HYDROMORPHONE HYDROCHLORIDE 0.5 MG: 1 INJECTION, SOLUTION INTRAMUSCULAR; INTRAVENOUS; SUBCUTANEOUS at 12:40

## 2020-10-29 RX ADMIN — MIDAZOLAM 2 MG: 1 INJECTION INTRAMUSCULAR; INTRAVENOUS at 07:30

## 2020-10-29 RX ADMIN — FENTANYL CITRATE 50 MCG: 50 INJECTION, SOLUTION INTRAMUSCULAR; INTRAVENOUS at 07:41

## 2020-10-29 ASSESSMENT — PAIN SCALES - GENERAL
PAINLEVEL_OUTOF10: 5
PAINLEVEL_OUTOF10: 9
PAINLEVEL_OUTOF10: 5
PAINLEVEL_OUTOF10: 10
PAINLEVEL_OUTOF10: 8
PAINLEVEL_OUTOF10: 10
PAINLEVEL_OUTOF10: 10

## 2020-10-29 ASSESSMENT — PULMONARY FUNCTION TESTS
PIF_VALUE: 22
PIF_VALUE: 14
PIF_VALUE: 20
PIF_VALUE: 23
PIF_VALUE: 15
PIF_VALUE: 21
PIF_VALUE: 22
PIF_VALUE: 22
PIF_VALUE: 23
PIF_VALUE: 2
PIF_VALUE: 20
PIF_VALUE: 23
PIF_VALUE: 21
PIF_VALUE: 22
PIF_VALUE: 23
PIF_VALUE: 23
PIF_VALUE: 21
PIF_VALUE: 26
PIF_VALUE: 26
PIF_VALUE: 20
PIF_VALUE: 14
PIF_VALUE: 22
PIF_VALUE: 21
PIF_VALUE: 21
PIF_VALUE: 2
PIF_VALUE: 21
PIF_VALUE: 25
PIF_VALUE: 14
PIF_VALUE: 23
PIF_VALUE: 22
PIF_VALUE: 21
PIF_VALUE: 22
PIF_VALUE: 23
PIF_VALUE: 21
PIF_VALUE: 21
PIF_VALUE: 14
PIF_VALUE: 32
PIF_VALUE: 15
PIF_VALUE: 22
PIF_VALUE: 14
PIF_VALUE: 14
PIF_VALUE: 20
PIF_VALUE: 27
PIF_VALUE: 15
PIF_VALUE: 22
PIF_VALUE: 29
PIF_VALUE: 14
PIF_VALUE: 15
PIF_VALUE: 20
PIF_VALUE: 13
PIF_VALUE: 14
PIF_VALUE: 3
PIF_VALUE: 15
PIF_VALUE: 21
PIF_VALUE: 21
PIF_VALUE: 0
PIF_VALUE: 21
PIF_VALUE: 14
PIF_VALUE: 14
PIF_VALUE: 11
PIF_VALUE: 21
PIF_VALUE: 0
PIF_VALUE: 2
PIF_VALUE: 23
PIF_VALUE: 21
PIF_VALUE: 3
PIF_VALUE: 20
PIF_VALUE: 22
PIF_VALUE: 21
PIF_VALUE: 28
PIF_VALUE: 10
PIF_VALUE: 27
PIF_VALUE: 21
PIF_VALUE: 3
PIF_VALUE: 14
PIF_VALUE: 26
PIF_VALUE: 24
PIF_VALUE: 20
PIF_VALUE: 21
PIF_VALUE: 33
PIF_VALUE: 20
PIF_VALUE: 0
PIF_VALUE: 21
PIF_VALUE: 25
PIF_VALUE: 20
PIF_VALUE: 21
PIF_VALUE: 22
PIF_VALUE: 1
PIF_VALUE: 21
PIF_VALUE: 21
PIF_VALUE: 14
PIF_VALUE: 27
PIF_VALUE: 22
PIF_VALUE: 0
PIF_VALUE: 24
PIF_VALUE: 15
PIF_VALUE: 1
PIF_VALUE: 20
PIF_VALUE: 20
PIF_VALUE: 2
PIF_VALUE: 23
PIF_VALUE: 26
PIF_VALUE: 0
PIF_VALUE: 23
PIF_VALUE: 26
PIF_VALUE: 22
PIF_VALUE: 21
PIF_VALUE: 21
PIF_VALUE: 27
PIF_VALUE: 21
PIF_VALUE: 14
PIF_VALUE: 16
PIF_VALUE: 30
PIF_VALUE: 0
PIF_VALUE: 7
PIF_VALUE: 27
PIF_VALUE: 8
PIF_VALUE: 21
PIF_VALUE: 24
PIF_VALUE: 26
PIF_VALUE: 22
PIF_VALUE: 22
PIF_VALUE: 26
PIF_VALUE: 26
PIF_VALUE: 0
PIF_VALUE: 15
PIF_VALUE: 2
PIF_VALUE: 14
PIF_VALUE: 27
PIF_VALUE: 21
PIF_VALUE: 24
PIF_VALUE: 23
PIF_VALUE: 12
PIF_VALUE: 26
PIF_VALUE: 22
PIF_VALUE: 21
PIF_VALUE: 15
PIF_VALUE: 20
PIF_VALUE: 21
PIF_VALUE: 14
PIF_VALUE: 21
PIF_VALUE: 27
PIF_VALUE: 32
PIF_VALUE: 26
PIF_VALUE: 22
PIF_VALUE: 1
PIF_VALUE: 14
PIF_VALUE: 25
PIF_VALUE: 23
PIF_VALUE: 27
PIF_VALUE: 15
PIF_VALUE: 23
PIF_VALUE: 10
PIF_VALUE: 23
PIF_VALUE: 21
PIF_VALUE: 21
PIF_VALUE: 1
PIF_VALUE: 20
PIF_VALUE: 23
PIF_VALUE: 25
PIF_VALUE: 25
PIF_VALUE: 21
PIF_VALUE: 21
PIF_VALUE: 20
PIF_VALUE: 14
PIF_VALUE: 23
PIF_VALUE: 20
PIF_VALUE: 21
PIF_VALUE: 20
PIF_VALUE: 26
PIF_VALUE: 33
PIF_VALUE: 23
PIF_VALUE: 14
PIF_VALUE: 14
PIF_VALUE: 1
PIF_VALUE: 21
PIF_VALUE: 14
PIF_VALUE: 21
PIF_VALUE: 21
PIF_VALUE: 24
PIF_VALUE: 1
PIF_VALUE: 1
PIF_VALUE: 21
PIF_VALUE: 14
PIF_VALUE: 21
PIF_VALUE: 22
PIF_VALUE: 10
PIF_VALUE: 21
PIF_VALUE: 23
PIF_VALUE: 22
PIF_VALUE: 21
PIF_VALUE: 23
PIF_VALUE: 27
PIF_VALUE: 26
PIF_VALUE: 14
PIF_VALUE: 15
PIF_VALUE: 24
PIF_VALUE: 26
PIF_VALUE: 20
PIF_VALUE: 20
PIF_VALUE: 21
PIF_VALUE: 21
PIF_VALUE: 26
PIF_VALUE: 27
PIF_VALUE: 24
PIF_VALUE: 20
PIF_VALUE: 22
PIF_VALUE: 26
PIF_VALUE: 23
PIF_VALUE: 15
PIF_VALUE: 22
PIF_VALUE: 15
PIF_VALUE: 26
PIF_VALUE: 27
PIF_VALUE: 26
PIF_VALUE: 20
PIF_VALUE: 26
PIF_VALUE: 24
PIF_VALUE: 26
PIF_VALUE: 2
PIF_VALUE: 27
PIF_VALUE: 21
PIF_VALUE: 21
PIF_VALUE: 22
PIF_VALUE: 1
PIF_VALUE: 21
PIF_VALUE: 14
PIF_VALUE: 23
PIF_VALUE: 22
PIF_VALUE: 16
PIF_VALUE: 14
PIF_VALUE: 15
PIF_VALUE: 20
PIF_VALUE: 24
PIF_VALUE: 26
PIF_VALUE: 21
PIF_VALUE: 0
PIF_VALUE: 20
PIF_VALUE: 20
PIF_VALUE: 24
PIF_VALUE: 22
PIF_VALUE: 15

## 2020-10-29 ASSESSMENT — LIFESTYLE VARIABLES: SMOKING_STATUS: 1

## 2020-10-29 ASSESSMENT — PAIN DESCRIPTION - LOCATION
LOCATION: ABDOMEN
LOCATION: ABDOMEN

## 2020-10-29 ASSESSMENT — PAIN DESCRIPTION - PAIN TYPE
TYPE: SURGICAL PAIN
TYPE: SURGICAL PAIN

## 2020-10-29 ASSESSMENT — PAIN - FUNCTIONAL ASSESSMENT: PAIN_FUNCTIONAL_ASSESSMENT: 0-10

## 2020-10-29 NOTE — DISCHARGE SUMMARY
Gyn Discharge Summary  [facility]      Patient Name: Sal Collins  Patient : 1975  Primary Care Physician: No primary care provider on file. Admit Date: 10/29/2020    Principal Diagnosis: AUB, dysmenorrhea, vulvar nevi    Other Diagnosis:   Postoperative state [Z98.890]  Patient Active Problem List   Diagnosis    Irregular bleeding    Dysmenorrhea    Left ovarian enlargement    Nevus of labia majora    Fibroids    Enlarged uterus    Menorrhagia with regular cycle    LAVH, RSO,YAHAIRA, FOE, vulvar biopsies, & cysto 10/29/2020    Endometriosis    Pelvic adhesive disease    Anemia, blood loss       Infection: No  Hospital Acquired: No    Surgical Operations & Procedures: Laparoscopic assisted vaginal hysterectomy, right salpingo-oophorectomy, lysis of adhesions, fulguration of endometriosis implants, vulvar biopsies and cystoscopy on 10/29/2020    Consultations: none and Anesthesia    Pertinent Findings & Procedures:   Sal Collins is a 40 y.o. female , admitted for scheduled LAVH with vulvar biopsies due to history of AUB, dysmenorrhea and vulvar nevi; received Ancef preoperatively. She underwent laparoscopic assisted vaginal hysterectomy, right salpingo-oophorectomy, lysis of adhesions, fulguration of endometriosis implants, vulvar biopsies and cystoscopy,  on 10/29/2020. CBC, BMP on POD#1 were wnl, except Hgb of 8.8, patient was clinically asymptomatic and was started on Iron. Patient complained of SOB on POD#1, CXR was negative and pulse ox was 97% on RA, VS otherwise stable. SOB resolved. Post-op course normal, discharged home on 10/30/2020. Follow up in 1 week. Discharge instructions reviewed and questions answered.     Course of patient: normal    Discharge to: Home    Readmission planned: No    Recommendations on Discharge:     Medications:   Jose M Loev   Home Medication Instructions [facility]:[id number]    Printed on:10/30/20 2379   Medication Information ferrous sulfate (FE TABS) 325 (65 Fe) MG EC tablet  Take 1 tablet by mouth daily (with breakfast)             ibuprofen (ADVIL;MOTRIN) 800 MG tablet  Take 1 tablet by mouth every 8 hours             ondansetron (ZOFRAN-ODT) 4 MG disintegrating tablet  Take 1 tablet by mouth every 8 hours as needed for Nausea or Vomiting             oxyCODONE-acetaminophen (PERCOCET) 5-325 MG per tablet  Take 1 tablet by mouth every 4 hours as needed for Pain for up to 3 days. sennosides-docusate sodium (SENOKOT-S) 8.6-50 MG tablet  Take 1 tablet by mouth 2 times daily             simethicone (MYLICON) 80 MG chewable tablet  Take 1 tablet by mouth 4 times daily                   Activity: pelvic rest x 8 weeks, no driving on narcotics, no lifting greater than 15 lbs  Diet: regular diet  Follow up: 1 weeks     Condition on discharge: good and stable   Discharge Date: 10/30/2020    Comments:  Home care, Follow-up care, restrictions reviewed. [address], DO  Ob/Gyn Resident  [facility]  10/30/2020, 4:45 PM

## 2020-10-29 NOTE — ANESTHESIA PRE PROCEDURE
Department of Anesthesiology  Preprocedure Note       Name:  Holly Banda   Age:  40 y.o.  :  1975                                          MRN:  [id number]         Date:  10/29/2020      Surgeon: Syeda Nolasco):  Stormy Irby MD    Procedure: Procedure(s): HYSTERECTOMY VAGINAL LAPAROSCOPIC ASSISTED (LAVH)    Medications prior to admission:   Prior to Admission medications    Medication Sig Start Date End Date Taking?  Authorizing Provider   ibuprofen (ADVIL;MOTRIN) 600 MG tablet Take 600 mg by mouth every 6 hours as needed for Pain   Yes Historical Provider, MD       Current medications:    Current Facility-Administered Medications   Medication Dose Route Frequency Provider Last Rate Last Dose    lactated ringers infusion   Intravenous Continuous Stacy Maldonado MD        sodium chloride flush 0.9 % injection 10 mL  10 mL Intravenous 2 times per day Stacy Maldonado MD        sodium chloride flush 0.9 % injection 10 mL  10 mL Intravenous PRN Stacy Maldonado MD        lidocaine PF 1 % injection 1 mL  1 mL Intradermal Once PRN Stacy Maldonado MD        scopolamine (TRANSDERM-SCOP) transdermal patch 1 patch  1 patch Transdermal Ning Daniels MD           Allergies:  No Known Allergies    Problem List:    Patient Active Problem List   Diagnosis Code    Irregular bleeding N92.6    Dysmenorrhea N94.6    Left ovarian enlargement N83.8    Nevus of labia majora D28.0    Fibroids D21.9    Enlarged uterus N85.2    Menorrhagia with regular cycle N92.0       Past Medical History:        Diagnosis Date    Enlarged uterus     Fibroid uterus     Irregular bleeding     Non-English speaking patient     Pashto speaking    Pelvic pain        Past Surgical History:        Procedure Laterality Date    BREAST ENHANCEMENT SURGERY      OVARY REMOVAL Right     SALPINGECTOMY         Social History:    Social History     Tobacco Use    Smoking status: Current Every Day Smoker     Packs/day: 1.00    Smokeless tobacco: Never Used   Substance Use Topics    Alcohol use: No                                Ready to quit: Not Answered  Counseling given: Not Answered      Vital Signs (Current):   Vitals:    10/29/20 0613   BP: 121/80   Pulse: 77   Resp: 18   Temp: 97.8 °F (36.6 °C)   TempSrc: Infrared   SpO2: 100%   Weight: 122 lb (55.3 kg)   Height: 5' 3\" (1.6 m)                                              BP Readings from Last 3 Encounters:   10/29/20 121/80   10/15/20 (!) 140/82   09/25/20 127/83       NPO Status: Time of last liquid consumption: 2040                        Time of last solid consumption: 1800                        Date of last liquid consumption: 10/28/20                        Date of last solid food consumption: 10/28/20    BMI:   Wt Readings from Last 3 Encounters:   10/29/20 122 lb (55.3 kg)   10/15/20 122 lb (55.3 kg)   09/25/20 123 lb (55.8 kg)     Body mass index is 21.61 kg/m². CBC:   Lab Results   Component Value Date    WBC 10.9 10/15/2020    RBC 4.16 10/15/2020    HGB 11.9 10/15/2020    HCT 35.6 10/15/2020    MCV 85.4 10/15/2020    RDW 17.0 10/15/2020     10/15/2020       CMP: No results found for: NA, K, CL, CO2, BUN, CREATININE, GFRAA, AGRATIO, LABGLOM, GLUCOSE, PROT, CALCIUM, BILITOT, ALKPHOS, AST, ALT    POC Tests: No results for input(s): POCGLU, POCNA, POCK, POCCL, POCBUN, POCHEMO, POCHCT in the last 72 hours.     Coags: No results found for: PROTIME, INR, APTT    HCG (If Applicable):   Lab Results   Component Value Date    HCGQUANT 501 (H) 08/24/2013        ABGs: No results found for: PHART, PO2ART, LHY0WUN, AHW8TPL, BEART, L9EILVUG     Type & Screen (If Applicable):  No results found for: LABABO, LABRH    Drug/Infectious Status (If Applicable):  No results found for: HIV, HEPCAB    COVID-19 Screening (If Applicable):   Lab Results   Component Value Date    COVID19 Not Detected 10/25/2020         Anesthesia Evaluation  Patient summary reviewed and Nursing notes reviewed no history of anesthetic complications:   Airway: Mallampati: II  TM distance: >3 FB   Neck ROM: full  Mouth opening: > = 3 FB Dental: normal exam         Pulmonary:normal exam  breath sounds clear to auscultation  (+) current smoker                           Cardiovascular:Negative CV ROS            Rhythm: regular  Rate: normal                    Neuro/Psych:   Negative Neuro/Psych ROS              GI/Hepatic/Renal: Neg GI/Hepatic/Renal ROS            Endo/Other:                      ROS comment: Irregular bleeding   Fibroid uterus Abdominal:           Vascular: negative vascular ROS. Anesthesia Plan      general     ASA 2       Induction: intravenous. MIPS: Postoperative opioids intended and Prophylactic antiemetics administered. Anesthetic plan and risks discussed with patient. Plan discussed with CRNA. patient understands some [place], she is Malian speaking and her daughter is by the bedside who is helping her with translation. The official translation line was not available, so Dr Sharan Love acted as the official .         Nisa Hester MD   10/29/2020

## 2020-10-29 NOTE — PROGRESS NOTES
Patient admitted to room 2073. Patient is drowsy but awakens easily. Call light within reach. Bed wheels locked. 2/4 side rails up. Dressings and omar pad checked. No signs of distress at this time.

## 2020-10-29 NOTE — BRIEF OP NOTE
Brief Operative Note  Department of Obstetrics and Gynecology  [facility]     Patient: Manuela Ferris   : 1975  MRN: [id number]       Acct: [de-identified]   Date of Procedure: 10/29/20     Pre-operative Diagnosis: 40 y.o. female   Abnormal uterine bleeding   Menorrhagia   Dysmenorrhea    Vulvar nevi   Hx of open LSO    Post-operative Diagnosis: Same as above  Pelvic adhesive disease   Frozen pelvis   Severe endometriosis  Uterine fibroid    Procedure: Laparoscopic assisted vaginal hysterectomy, right salpingo-oophorectomy, lysis of adhesions, fulguration of endometriosis implants, vulvar biopsies and cystoscopy     Surgeon: Dr. Eileen Zimmerman MD      Assistant(s): Rosanna Vila DO, PGY4; Ilsa Camarena DO, PGY4; Shanae Joaquin, MS3    Anesthesia: general via endotracheal tube     Findings:  Normal external genitalia with bilateral vulvar nevi, normal vagina, normal cervix; diffuse pelvic adhesions between bowel and anterior abdominal wall, as well as frozen pelvis with bowel adhesions to the right fallopian tube and ovary, adhesions between the uterus and right fallopian tube and ovary and the right abdominal wall, enlarged right ovary small 2cm anterior uterine fibroid, otherwise normal appearing uterus, severe endometriosis with powder burn lesions in the posterior cul-de sac and over the left uterosacral ligament; normal appearing bladder without masses, stones, defects, or sutures, normal bilateral ureteral jets   Total IV fluids/Blood products:  1600 ml crystalloid  Urine Output:  275 ml prior to cysto   Estimated blood loss:  50ml  Drains:  wilkes catheter  Specimens:  Uterus, cervix, right fallopian tube, right ovary, right and left vulvar biopsies   Instrument and Sponge Count: Correct  Complications:  none  Condition:  stable, transferred to post anesthesia recovery    See full operative report for further details.     Rosanna Vila DO  Ob/Gyn Resident  Pager: [phone]  10/29/2020, 11:42 AM

## 2020-10-29 NOTE — INTERVAL H&P NOTE
Update History & Physical    The patient's History and Physical of October 15, 2020 was reviewed with the patient and I examined the patient. There was no change. The surgical site was confirmed by the patient and me. Patient has been NPO since midnight and does not take any blood thinners. Plan: The risks, benefits, expected outcome, and alternative to the recommended procedure have been discussed with the patient. Patient understands and wants to proceed with the procedure.      Electronically signed by AIMEE Abernathy CNP on 10/29/2020 at 5:55 AM

## 2020-10-29 NOTE — ANESTHESIA POSTPROCEDURE EVALUATION
Department of Anesthesiology  Postprocedure Note    Patient: Yaa Manjarrez  MRN: [id number]  YOB: 1975  Date of evaluation: 10/29/2020  Time:  1:17 PM     Procedure Summary     Date:  10/29/20 Room / Location:  [address] [place] [address] / [facility]: PATRICIA ALLEN    Anesthesia Start:  9403 Anesthesia Stop:  3542    Procedure:  HYSTERECTOMY VAGINAL LAPAROSCOPIC ASSISTED (LAVH) W/ CYSTOSCOPY AND VULVA BIOPSY (N/A Vagina ) Diagnosis:  (MULTI FIBROIDS PELVIC PAIN VULVAR NEVI)    Surgeon:  Keith Lucas MD Responsible Provider:  Danuta Metzger MD    Anesthesia Type:  general ASA Status:  2          Anesthesia Type: general    Nathalie Phase I: Nathalie Score: 8    Nathalie Phase II:      Last vitals: Reviewed and per EMR flowsheets.        Anesthesia Post Evaluation    Comments: POST- ANESTHESIA EVALUATION       Pt Name: Yaa Manjarrez  MRN: [id number]  YOB: 1975  Date of evaluation: 10/29/2020  Time:  1:17 PM      /71   Pulse 91   Temp 97.1 °F (36.2 °C) (Infrared)   Resp 22   Ht 5' 3\" (1.6 m)   Wt 122 lb (55.3 kg)   SpO2 96%   BMI 21.61 kg/m²      Consciousness Level  Awake  Cardiopulmonary Status  Stable  Pain Adequately Treated YES  Nausea / Vomiting  NO  Adequate Hydration  YES  Anesthesia Related Complications NONE      Electronically signed by Danuta Metzger MD on 10/29/2020 at 1:17 PM

## 2020-10-29 NOTE — PROGRESS NOTES
Gynecology Progress Note    Date: 10/29/2020  Time: 6:08 PM    Manuela Ferris 40 y.o. female , POD # 0 s/p Laparoscopic assisted vaginal hysterectomy, right salpingo-oophorectomy, lysis of adhesions, fulguration of endometriosis implants, vulvar biopsies and cystoscopy on 10/29/2020    Patient seen and examined. She complained of appropriate postoperative abdominal pain. Pain is controlled with pain meds and she is declining any additional medication at this time. Patient is  tolerating oral liquid intake. Crisostomo in placed draining clear urine, UOP adequate. She denies any vaginal bleeding. She is not ambulating. She is not passing flatus. She denies Fever/Chills, Chest Pain, SOB, N/V, Calf Pain    Vitals:  Vitals:    10/29/20 1230 10/29/20 1240 10/29/20 1250 10/29/20 1319   BP: 128/72 130/73 132/71 135/66   Pulse: 89 82 91 73   Resp: 20 12 22 14   Temp:   97.1 °F (36.2 °C) 98.1 °F (36.7 °C)   TempSrc:   Infrared Axillary   SpO2: 93% 93% 96% 99%   Weight:       Height:             Intake/Output:   Last Shift: I/O last 3 completed shifts: In: 1600 [I.V.:1600]  Out: 365 [Urine:315; Blood:50]  Current Shift: I/O this shift:  In: -   Out: 625 [Urine:625]  200 mL out in last 1 hrs ~ 200mL/hr      Physical Exam:  General:  no apparent distress, alert and cooperative  Neurologic:  alert, oriented, normal speech, no focal findings or movement disorder noted  Lungs:  No increased work of breathing, good air exchange, clear to auscultation bilaterally, no crackles or wheezing  Heart:  regular rate and rhythm and no murmur    Abdomen: soft, non-distended, appropriate tenderness, no CVA tenderness, absent bowel sounds  Incision:  Three laparoscopic port sites clean, dry and intact with overlying steri strips and Tegaderm  Extremities:  no calf tenderness, non edematous, SCDs on and working    Lab:  Recent Results (from the past 12 hour(s))   Urinalysis Reflex to Culture    Collection Time: 10/29/20 11:02 AM    Specimen: Urine, indwelling catheter   Result Value Ref Range    Color, UA YELLOW YELLOW    Turbidity UA CLEAR CLEAR    Glucose, Ur NEGATIVE NEGATIVE    Bilirubin Urine NEGATIVE NEGATIVE    Ketones, Urine NEGATIVE NEGATIVE    Specific [place], UA 1.014 1.000 - 1.030    Urine Hgb MOD (A) NEGATIVE    pH, UA 7.0 5.0 - 8.0    Protein, UA TRACE (A) NEGATIVE    Urobilinogen, Urine Normal Normal    Nitrite, Urine NEGATIVE NEGATIVE    Leukocyte Esterase, Urine NEGATIVE NEGATIVE    Urinalysis Comments NOT REPORTED    Microscopic Urinalysis    Collection Time: 10/29/20 11:02 AM   Result Value Ref Range    -          WBC, UA 5 TO 10 /HPF    RBC, UA 5 TO 10 /HPF    Casts UA NOT REPORTED /LPF    Crystals, UA NOT REPORTED None /HPF    Epithelial Cells UA 2 TO 5 /HPF    Renal Epithelial, UA NOT REPORTED 0 /HPF    Bacteria, UA FEW (A) None    Mucus, UA NOT REPORTED None    Trichomonas, UA NOT REPORTED None    Amorphous, UA NOT REPORTED None    Other Observations UA NOT REPORTED NOT REQ.     Yeast, UA NOT REPORTED None       Assessment/Plan:  Myramarci Delatorre 40 y.o. female , POD #0 s/p Laparoscopic assisted vaginal hysterectomy, right salpingo-oophorectomy, lysis of adhesions, fulguration of endometriosis implants, vulvar biopsies and cystoscopy    - Doing well, vitals stable   - continue wilkes catheter, UOP good   - continue IVF   - Encourage ambulation and use of incentive spirometer   - Pain control: Dilaudid/Toradol, switch to PO Motrin/Percocet when tolerating PO intake    - Labs: CBC, BMP in the AM    - DVT Proph: SCDs    - Abx: Ancef x24h    - Diet: General    - Path: Pending    - Continue post-op care, please page with any questions      Zaida Beebe DO  Ob/Gyn Resident  Pager: [phone]  10/29/2020, 6:08 PM

## 2020-10-29 NOTE — OP NOTE
Operative Note  Department of Obstetrics and Gynecology  [facility]       Patient: Corine Swenson   : 1975  MRN: [id number]       Acct: [de-identified]   PCP: No primary care provider on file. Date of Procedure: 10/29/20    Pre-operative Diagnosis: 40 y.o. female   Abnormal uterine bleeding   Menorrhagia   Dysmenorrhea    Vulvar nevi   Hx of open LSO     Post-operative Diagnosis: Same as above  Pelvic adhesive disease   Frozen pelvis   Severe endometriosis  Uterine fibroid     Procedure: Laparoscopic assisted vaginal hysterectomy, right salpingo-oophorectomy, lysis of adhesions, fulguration of endometriosis implants, vulvar biopsies and cystoscopy      Surgeon: Dr. Amaya Mckeon MD      Assistant(s): Arun Loving DO, PGY4; Cyndi Elliott DO, PGY4; Leobardo Atkins, MS3     Anesthesia: general via endotracheal tube     Indications: 40 y.o. female  with history of AUB with menorrhagia and dysmenorrhea who had tried medical management with OCPs in the past, requested definitive surgical management with hysterectomy. Patient was also noted to have bilateral vulvar nevi and recommendation was made to proceed with vulvar biopsies at the time of hysterectomy. Risks, benefits, and alternatives were discussed in detail prior to surgery. Procedure Details: The patient was seen in the pre-op room. The risks, benefits, complications, treatment options, and expected outcomes were discussed with the patient. The patient concurred with the proposed plan, giving informed consent. The patient was taken to the Operating Room and identified as Corine Swenson and the procedure was verified. A Time Out was held and the above information confirmed. After administration of general anesthesia, the patient was placed in the dorsolithotomy position with Yellofin stirrups and examination under general anesthesia was performed with findings as noted below.  A wilkes catheter was inserted then grasped, coagulated and cut with the Ligasure. The broad ligament was then elevated and coagulated and cut extending this over the anterior portion of the uterocervical junction to create the bladder flap. The uterine vessels were then skeletonized and were coagulated with the Ligasure. The left anterior broad ligament was then elevated, cauterized and cut to meet the previously created bladder flap on the right. The left uterine vessels were isolated, cauterized and cut with the Ligasure. The pelvis was irrigated and hemostasis was noted. At this time all instruments were removed from the abdomen. Insufflation was allowed to escape and attention was turned to the vaginal portion of the case. The uterine manipulator was removed and using the single tooth tenaculum for traction on the anterior and posterior lips of the cervix, the cervicovaginal junction was circumferentially incised using bovie electrocautery. The bladder was pushed upward using a Joe retractor in the anterior fornix. A posterior colpotomy was created sanford scissors and the weighted speculum was placed in the posterior cul-de-sac for better visualization. Uterosacral ligaments were then identified and clamped with a curved Hernandez clamp, cut and suture ligated with 1-Vicryl leaving each ligament tagged. The parametrial tissue was then identified and clamped, cut and tied with 1-Vicryl. The uterus and cervix was then extracted transvaginally intact. Pedicles were evaluated and hemostasis was noted. The vaginal cuff was closed with 1 Vicryl in running fashion and a figure-of-eight at each corner incorporating the uterosacral ligaments into the closure. The suture met in the middle of the vaginal cuff with excellent reapproximation of tissue and hemostasis. All instruments were then removed from the vagina. Next the wilkes catheter was removed and a cystoscopy was performed revealing a normal appearing bladder without defects or sutures. Resident  10/29/2020, 2:13 PM

## 2020-10-29 NOTE — FLOWSHEET NOTE
10/29/20 1841   Encounter Summary   Services provided to: Patient   Referral/Consult From: Rounding   Complexity of Encounter Low   Length of Encounter 15 minutes   Spiritual/Rastafari   Type Spiritual support   Assessment Sleeping   Intervention Prayer

## 2020-10-30 ENCOUNTER — APPOINTMENT (OUTPATIENT)
Dept: GENERAL RADIOLOGY | Age: 45
DRG: 743 | End: 2020-10-30
Attending: SPECIALIST
Payer: COMMERCIAL

## 2020-10-30 VITALS
HEART RATE: 82 BPM | TEMPERATURE: 98.6 F | OXYGEN SATURATION: 97 % | DIASTOLIC BLOOD PRESSURE: 55 MMHG | BODY MASS INDEX: 24.53 KG/M2 | HEIGHT: 63 IN | WEIGHT: 138.45 LBS | SYSTOLIC BLOOD PRESSURE: 108 MMHG | RESPIRATION RATE: 18 BRPM

## 2020-10-30 PROBLEM — D50.0 ANEMIA, BLOOD LOSS: Status: ACTIVE | Noted: 2020-10-30

## 2020-10-30 LAB
ABSOLUTE EOS #: 0 K/UL (ref 0–0.4)
ABSOLUTE IMMATURE GRANULOCYTE: ABNORMAL K/UL (ref 0–0.3)
ABSOLUTE LYMPH #: 1.9 K/UL (ref 1–4.8)
ABSOLUTE MONO #: 0.9 K/UL (ref 0.1–1.3)
ANION GAP SERPL CALCULATED.3IONS-SCNC: 8 MMOL/L (ref 9–17)
BASOPHILS # BLD: 0 % (ref 0–2)
BASOPHILS ABSOLUTE: 0.1 K/UL (ref 0–0.2)
BUN BLDV-MCNC: 8 MG/DL (ref 6–20)
BUN/CREAT BLD: ABNORMAL (ref 9–20)
CALCIUM SERPL-MCNC: 7.7 MG/DL (ref 8.6–10.4)
CHLORIDE BLD-SCNC: 106 MMOL/L (ref 98–107)
CO2: 24 MMOL/L (ref 20–31)
CREAT SERPL-MCNC: 0.66 MG/DL (ref 0.5–0.9)
DIFFERENTIAL TYPE: ABNORMAL
EOSINOPHILS RELATIVE PERCENT: 0 % (ref 0–4)
GFR AFRICAN AMERICAN: >60 ML/MIN
GFR NON-AFRICAN AMERICAN: >60 ML/MIN
GFR SERPL CREATININE-BSD FRML MDRD: ABNORMAL ML/MIN/{1.73_M2}
GFR SERPL CREATININE-BSD FRML MDRD: ABNORMAL ML/MIN/{1.73_M2}
GLUCOSE BLD-MCNC: 102 MG/DL (ref 70–99)
HCT VFR BLD CALC: 26.5 % (ref 36–46)
HEMOGLOBIN: 8.8 G/DL (ref 12–16)
IMMATURE GRANULOCYTES: ABNORMAL %
LYMPHOCYTES # BLD: 16 % (ref 24–44)
MCH RBC QN AUTO: 28.3 PG (ref 26–34)
MCHC RBC AUTO-ENTMCNC: 33.2 G/DL (ref 31–37)
MCV RBC AUTO: 85.3 FL (ref 80–100)
MONOCYTES # BLD: 8 % (ref 1–7)
NRBC AUTOMATED: ABNORMAL PER 100 WBC
PDW BLD-RTO: 16.8 % (ref 11.5–14.9)
PLATELET # BLD: 263 K/UL (ref 150–450)
PLATELET ESTIMATE: ABNORMAL
PMV BLD AUTO: 8.6 FL (ref 6–12)
POTASSIUM SERPL-SCNC: 3.9 MMOL/L (ref 3.7–5.3)
RBC # BLD: 3.11 M/UL (ref 4–5.2)
RBC # BLD: ABNORMAL 10*6/UL
SEG NEUTROPHILS: 76 % (ref 36–66)
SEGMENTED NEUTROPHILS ABSOLUTE COUNT: 8.7 K/UL (ref 1.3–9.1)
SODIUM BLD-SCNC: 138 MMOL/L (ref 135–144)
WBC # BLD: 11.6 K/UL (ref 3.5–11)
WBC # BLD: ABNORMAL 10*3/UL

## 2020-10-30 PROCEDURE — 6360000002 HC RX W HCPCS: Performed by: STUDENT IN AN ORGANIZED HEALTH CARE EDUCATION/TRAINING PROGRAM

## 2020-10-30 PROCEDURE — 71045 X-RAY EXAM CHEST 1 VIEW: CPT

## 2020-10-30 PROCEDURE — 85025 COMPLETE CBC W/AUTO DIFF WBC: CPT

## 2020-10-30 PROCEDURE — 6370000000 HC RX 637 (ALT 250 FOR IP): Performed by: STUDENT IN AN ORGANIZED HEALTH CARE EDUCATION/TRAINING PROGRAM

## 2020-10-30 PROCEDURE — 36415 COLL VENOUS BLD VENIPUNCTURE: CPT

## 2020-10-30 PROCEDURE — 80048 BASIC METABOLIC PNL TOTAL CA: CPT

## 2020-10-30 PROCEDURE — 2580000003 HC RX 258: Performed by: STUDENT IN AN ORGANIZED HEALTH CARE EDUCATION/TRAINING PROGRAM

## 2020-10-30 RX ORDER — OXYCODONE HYDROCHLORIDE AND ACETAMINOPHEN 5; 325 MG/1; MG/1
1 TABLET ORAL EVERY 4 HOURS PRN
Qty: 18 TABLET | Refills: 0 | Status: SHIPPED | OUTPATIENT
Start: 2020-10-30 | End: 2020-11-02

## 2020-10-30 RX ORDER — IBUPROFEN 800 MG/1
800 TABLET ORAL EVERY 8 HOURS
Qty: 60 TABLET | Refills: 0 | Status: SHIPPED | OUTPATIENT
Start: 2020-10-30

## 2020-10-30 RX ORDER — SIMETHICONE 80 MG
80 TABLET,CHEWABLE ORAL 4 TIMES DAILY
Qty: 40 TABLET | Refills: 0 | Status: SHIPPED | OUTPATIENT
Start: 2020-10-30

## 2020-10-30 RX ORDER — ONDANSETRON 4 MG/1
4 TABLET, ORALLY DISINTEGRATING ORAL EVERY 8 HOURS PRN
Qty: 12 TABLET | Refills: 0 | Status: SHIPPED | OUTPATIENT
Start: 2020-10-30

## 2020-10-30 RX ORDER — SENNA AND DOCUSATE SODIUM 50; 8.6 MG/1; MG/1
1 TABLET, FILM COATED ORAL 2 TIMES DAILY
Qty: 60 TABLET | Refills: 0 | Status: SHIPPED | OUTPATIENT
Start: 2020-10-30

## 2020-10-30 RX ORDER — LANOLIN ALCOHOL/MO/W.PET/CERES
325 CREAM (GRAM) TOPICAL
Qty: 30 TABLET | Refills: 0 | Status: SHIPPED | OUTPATIENT
Start: 2020-10-30

## 2020-10-30 RX ADMIN — SENNOSIDES AND DOCUSATE SODIUM 1 TABLET: 8.6; 5 TABLET ORAL at 07:55

## 2020-10-30 RX ADMIN — OXYCODONE HYDROCHLORIDE AND ACETAMINOPHEN 1 TABLET: 5; 325 TABLET ORAL at 12:43

## 2020-10-30 RX ADMIN — SIMETHICONE 80 MG: 80 TABLET, CHEWABLE ORAL at 17:06

## 2020-10-30 RX ADMIN — IBUPROFEN 800 MG: 800 TABLET, FILM COATED ORAL at 12:43

## 2020-10-30 RX ADMIN — KETOROLAC TROMETHAMINE 30 MG: 30 INJECTION, SOLUTION INTRAMUSCULAR; INTRAVENOUS at 05:47

## 2020-10-30 RX ADMIN — SIMETHICONE 80 MG: 80 TABLET, CHEWABLE ORAL at 10:40

## 2020-10-30 RX ADMIN — IBUPROFEN 800 MG: 800 TABLET, FILM COATED ORAL at 05:47

## 2020-10-30 RX ADMIN — SODIUM CHLORIDE: 9 INJECTION, SOLUTION INTRAVENOUS at 07:56

## 2020-10-30 RX ADMIN — SIMETHICONE 80 MG: 80 TABLET, CHEWABLE ORAL at 14:28

## 2020-10-30 ASSESSMENT — PAIN SCALES - GENERAL
PAINLEVEL_OUTOF10: 5
PAINLEVEL_OUTOF10: 6

## 2020-10-30 NOTE — CARE COORDINATION
CASE MANAGEMENT NOTE:    Admission Date:  10/29/2020 Annamaria Mahoney is a 40 y.o.  female    Admitted for : Postoperative state [Z98.890]    Met with:  Patient - Patient is primarily [address] speaking, however was able to answer my simple questions    PCP:  Does not have one and does not want one at this time                                Insurance:  BCBS      Current Residence/ Living Arrangements:  independently at home             Current Services PTA:  No    Is patient agreeable to VNS: No    Freedom of choice provided:  Yes    List of [address] provided: No    VNS chosen:  No    DME:  none    Home Oxygen: No    Nebulizer: No    CPAP/BIPAP: No    Supplier: N/A    Potential Assistance Needed: No    SNF needed: No    Freedom of choice and list provided: NA    [facility]:  [facility]       Does Patient want to use MEDS to BEDS? No    Is patient currently receiving oral anticoagulation therapy? No    Is the Patient an [facility] with Readmission Risk Score greater than 14%? No  If yes, pt needs a follow up appointment made within 7 days. Family Members/Caregivers that pt would like involved in their care:    Yes    If yes, list name here:  Daughter Latesha Oliveira    Transportation Provider:  Patient             Is patient in Isolation/One on One/Altered Mental Status? No  If yes, skip next question. If no, would they like an I-Pad to  use? No  If yes, call [phone]. Discharge Plan:  10/30: [facility] [address] - patient is from home with daughter. She is independent and drives. DME - None. Declines need for VNS. POD #1 vaginal hyst. Possible discharge home later today. Does not want PCP at this time.  //JUAN                 Electronically signed by: Krysten Hernandez RN on 10/30/2020 at 11:39 AM

## 2020-10-30 NOTE — DISCHARGE INSTR - ACTIVITY
No heavy lifting, pushing, or pulling (nothing greater than 10 lbs.)    May shower but no tub baths, pools or hot tubs    No driving while on narcotic pain medications (Percocet)

## 2020-10-30 NOTE — PLAN OF CARE
Problem: Pain:  Goal: Pain level will decrease  Description: Pain level will decrease  10/30/2020 1626 by Kamaljit Mann RN  Outcome: Ongoing  Note: Patients pain level decreased with prescribed pain medications. 10/30/2020 0344 by Jason Nunez RN  Outcome: Ongoing  Note: Adequate pain control achieved this shift. See MAR. Goal: Control of acute pain  Description: Control of acute pain  Outcome: Ongoing  Note: Patients pain level decreased with prescribed pain medications. Goal: Control of chronic pain  Description: Control of chronic pain  Outcome: Ongoing  Note: Patients pain level decreased with prescribed pain medications. Problem: Falls - Risk of:  Goal: Will remain free from falls  Description: Will remain free from falls  10/30/2020 1626 by Kamaljit Mann RN  Outcome: Ongoing  Note: No falls this shift. Call light with in reach, side rails up x2, bed in lowest postion. Patients safety maintained. 10/30/2020 0344 by Jason Nunez RN  Outcome: Ongoing  Note: Pt. Free of falls and injuries this shift. Goal: Absence of physical injury  Description: Absence of physical injury  Outcome: Ongoing  Note: No injury this shift. Patients safety maintained.

## 2020-10-30 NOTE — ANESTHESIA POSTPROCEDURE EVALUATION
Department of Anesthesiology  Postprocedure Note    Patient: Issa Arvizu  MRN: [id number]  YOB: 1975  Date of evaluation: 10/30/2020  Time:  9:40 AM     Procedure Summary     Date:  10/29/20 Room / Location:  [address] [place] [address] / [facility]: PATRICIA ALLEN    Anesthesia Start:  6241 Anesthesia Stop:  8774    Procedure:  HYSTERECTOMY VAGINAL LAPAROSCOPIC ASSISTED (LAVH) W/ CYSTOSCOPY AND VULVA BIOPSY (N/A Vagina ) Diagnosis:  (MULTI FIBROIDS PELVIC PAIN VULVAR NEVI)    Surgeon:  Celena Gomez MD Responsible Provider:  Ewelina Parr MD    Anesthesia Type:  general ASA Status:  2          Anesthesia Type: general    Nathalie Phase I: Nathalie Score: 8    Nathalie Phase II:      Last vitals: Reviewed and per EMR flowsheets. Anesthesia Post Evaluation    Comments: POD #1. Patient seen sitting up in bed. Denies any anesthesia related issues.

## 2020-10-30 NOTE — PROGRESS NOTES
Patient discharged home with all personal belongings. Discharge instructions and prescriptions given. Patient taken down to front entrance per wheelchair.

## 2020-10-30 NOTE — PROGRESS NOTES
Gynecology Progress Note    Date: 10/30/2020  Time: 8:21 AM    Issa Arvizu 40 y.o. female , POD # 1 s/p Laparoscopic assisted vaginal hysterectomy, right salpingo-oophorectomy, lysis of adhesions, fulguration of endometriosis implants, vulvar biopsies and cystoscopy on 10/29/2020    Patient seen and examined. She has no complaints this morning. Pain is controlled. Patient is  tolerating oral intake. Crisostomo catheter removed this AM,  ml overnight. She has not spontaneously voided yet. She denies any vaginal bleeding. She is not yet ambulating. She is not passing flatus. She denies Fever/Chills, Chest Pain, SOB, N/V, Calf Pain    Vitals:  Vitals:    10/29/20 1319 10/29/20 1911 10/30/20 0645 10/30/20 0700   BP: 135/66 125/65  121/63   Pulse: 73 70  74   Resp: 14 17  16   Temp: 98.1 °F (36.7 °C) 97.5 °F (36.4 °C)  97.3 °F (36.3 °C)   TempSrc: Axillary Oral  Oral   SpO2: 99% 100%     Weight:   138 lb 7.2 oz (62.8 kg)    Height:             Intake/Output:   Last Shift: I/O last 3 completed shifts: In: 8836 [P.O.:900; I.V.:3082]  Out: 1890 [Urine:1840; Blood:50]  Current Shift: No intake/output data recorded.   900 mL out in last 13.5 hrs ~ 66mL/hr      Physical Exam:  General:  no apparent distress, alert and cooperative  Neurologic:  alert, oriented, normal speech, no focal findings or movement disorder noted  Lungs:  No increased work of breathing, good air exchange, clear to auscultation bilaterally, no crackles or wheezing  Heart:  regular rate and rhythm and no murmur    Abdomen: soft, non-distended, appropriate tenderness, no CVA tenderness, normal bowel sounds  Incision: three laparoscopic port sites clean, dry and intact  Extremities:  no calf tenderness, non edematous, SCDs on and working     Lab:  Recent Results (from the past 12 hour(s))   Basic Metabolic Panel    Collection Time: 10/30/20  5:38 AM   Result Value Ref Range    Glucose 102 (H) 70 - 99 mg/dL    BUN 8 6 - 20 mg/dL    CREATININE 0. 66 0.50 - 0.90 mg/dL    Bun/Cre Ratio NOT REPORTED 9 - 20    Calcium 7.7 (L) 8.6 - 10.4 mg/dL    Sodium 138 135 - 144 mmol/L    Potassium 3.9 3.7 - 5.3 mmol/L    Chloride 106 98 - 107 mmol/L    CO2 24 20 - 31 mmol/L    Anion Gap 8 (L) 9 - 17 mmol/L    GFR Non-African American >60 >60 mL/min    GFR African American >60 >60 mL/min    GFR Comment          GFR Staging NOT REPORTED    CBC auto differential    Collection Time: 10/30/20  5:38 AM   Result Value Ref Range    WBC 11.6 (H) 3.5 - 11.0 k/uL    RBC 3.11 (L) 4.0 - 5.2 m/uL    Hemoglobin 8.8 (L) 12.0 - 16.0 g/dL    Hematocrit 26.5 (L) 36 - 46 %    MCV 85.3 80 - 100 fL    MCH 28.3 26 - 34 pg    MCHC 33.2 31 - 37 g/dL    RDW 16.8 (H) 11.5 - 14.9 %    Platelets 429 049 - 510 k/uL    MPV 8.6 6.0 - 12.0 fL    NRBC Automated NOT REPORTED per 100 WBC    Differential Type NOT REPORTED     Seg Neutrophils 76 (H) 36 - 66 %    Lymphocytes 16 (L) 24 - 44 %    Monocytes 8 (H) 1 - 7 %    Eosinophils % 0 0 - 4 %    Basophils 0 0 - 2 %    Immature Granulocytes NOT REPORTED 0 %    Segs Absolute 8.70 1.3 - 9.1 k/uL    Absolute Lymph # 1.90 1.0 - 4.8 k/uL    Absolute Mono # 0.90 0.1 - 1.3 k/uL    Absolute Eos # 0.00 0.0 - 0.4 k/uL    Basophils Absolute 0.10 0.0 - 0.2 k/uL    Absolute Immature Granulocyte NOT REPORTED 0.00 - 0.30 k/uL    WBC Morphology NOT REPORTED     RBC Morphology NOT REPORTED     Platelet Estimate NOT REPORTED        Assessment/Plan:  Cesar Prosper 40 y.o. female , POD #1 s/p Laparoscopic assisted vaginal hysterectomy, right salpingo-oophorectomy, lysis of adhesions, fulguration of endometriosis implants, vulvar biopsies and cystoscopy on 10/29/2020   - Doing well, vitals stable   - S/p wilkes catheter, UOP adequate. Awaiting spontaneous void.     - Discontinue IVF as patient is tolerating PO intake   - Encourage ambulation and use of incentive spirometer   - Pain control: Percocet/Motrin   - Labs: CBC, BMP done this AM    - DVT Proph: SCDs, ambulation encouraged    - Abx: S/p Ancef x24h    - Diet: General    - Path: pending    - Continue post-op care, please page with any questions    Anemia of acute blood losss    - Hgb 8.8 this AM    - Clinically asymptomatic, VSS    - Will prescribe PO Iron on discharge     Kavon Young DO  Ob/Gyn Resident  Pager: [phone]  10/30/2020,  I have discussed the care of Issa Arvizu, including pertinent history and exam findings, with the resident. I have seen and examined the patient and the key elements of all parts of the encounter have been performed by me. I agree with the assessment, plan and orders as documented by the resident.     Abhishek Sutton MD  Attending Physician

## 2020-10-30 NOTE — PLAN OF CARE
Problem: Pain:  Goal: Pain level will decrease  Description: Pain level will decrease  10/30/2020 1800 by Brenda Loza RN  Outcome: Completed  10/30/2020 1626 by Brenda Loza RN  Outcome: Ongoing  Note: Patients pain level decreased with prescribed pain medications. Goal: Control of acute pain  Description: Control of acute pain  10/30/2020 1800 by Brenda Loza RN  Outcome: Completed  10/30/2020 1626 by Brenda Loza RN  Outcome: Ongoing  Note: Patients pain level decreased with prescribed pain medications. Goal: Control of chronic pain  Description: Control of chronic pain  10/30/2020 1800 by Brenda Loza RN  Outcome: Completed  10/30/2020 1626 by Brenda Loza RN  Outcome: Ongoing  Note: Patients pain level decreased with prescribed pain medications. Problem: Falls - Risk of:  Goal: Will remain free from falls  Description: Will remain free from falls  10/30/2020 1800 by Brenda Loza RN  Outcome: Completed  10/30/2020 1626 by Brenda Loza RN  Outcome: Ongoing  Note: No falls this shift. Call light with in reach, side rails up x2, bed in lowest postion. Patients safety maintained. Goal: Absence of physical injury  Description: Absence of physical injury  10/30/2020 1800 by Brenda Loza RN  Outcome: Completed  10/30/2020 1626 by Brenda Loza RN  Outcome: Ongoing  Note: No injury this shift. Patients safety maintained.

## 2020-11-03 LAB — SURGICAL PATHOLOGY REPORT: NORMAL

## 2020-11-05 ENCOUNTER — OFFICE VISIT (OUTPATIENT)
Dept: OBGYN CLINIC | Age: 45
End: 2020-11-05

## 2020-11-05 VITALS
BODY MASS INDEX: 22.14 KG/M2 | WEIGHT: 125 LBS | SYSTOLIC BLOOD PRESSURE: 132 MMHG | TEMPERATURE: 97.9 F | DIASTOLIC BLOOD PRESSURE: 80 MMHG | HEART RATE: 66 BPM

## 2020-11-05 PROBLEM — Z48.89 POSTOPERATIVE VISIT: Status: ACTIVE | Noted: 2020-11-05

## 2020-11-05 PROCEDURE — 99024 POSTOP FOLLOW-UP VISIT: CPT | Performed by: SPECIALIST

## 2020-11-05 ASSESSMENT — ENCOUNTER SYMPTOMS
DIARRHEA: 0
COUGH: 0
ABDOMINAL PAIN: 0
EYE PAIN: 0
NAUSEA: 0
ABDOMINAL DISTENTION: 0
VOMITING: 0
CONSTIPATION: 0
APNEA: 0

## 2020-11-05 NOTE — PROGRESS NOTES
Subjective:      Patient ID: Elizabeth Salazar is a 40 y.o. female. Chief Complaint   Patient presents with    Post-Op Check     The patient is here for her 1 week postop check. She had LAVH on 10/29/2020. /80 (Site: Right Upper Arm, Position: Sitting, Cuff Size: Medium Adult)   Pulse 66   Temp 97.9 °F (36.6 °C) (Temporal)   Wt 125 lb (56.7 kg)   LMP 09/19/2020 (Exact Date)   Breastfeeding No   BMI 22.14 kg/m²   Patient's last menstrual period was 09/19/2020 (exact date). [id number]    Past Medical History:   Diagnosis Date    Enlarged uterus     Fibroid uterus     Irregular bleeding     Non-English speaking patient     Turkish speaking    Pelvic pain      Current Outpatient Medications Ordered in Epic   Medication Sig Dispense Refill    simethicone (MYLICON) 80 MG chewable tablet Take 1 tablet by mouth 4 times daily 40 tablet 0    ibuprofen (ADVIL;MOTRIN) 800 MG tablet Take 1 tablet by mouth every 8 hours (Patient not taking: Reported on 11/5/2020) 60 tablet 0    ondansetron (ZOFRAN-ODT) 4 MG disintegrating tablet Take 1 tablet by mouth every 8 hours as needed for Nausea or Vomiting (Patient not taking: Reported on 11/5/2020) 12 tablet 0    sennosides-docusate sodium (SENOKOT-S) 8.6-50 MG tablet Take 1 tablet by mouth 2 times daily (Patient not taking: Reported on 11/5/2020) 60 tablet 0    ferrous sulfate (FE TABS) 325 (65 Fe) MG EC tablet Take 1 tablet by mouth daily (with breakfast) (Patient not taking: Reported on 11/5/2020) 30 tablet 0     No current Epic-ordered facility-administered medications on file. Problem List Items Addressed This Visit     Postoperative visit - Primary        No Known Allergies  No orders of the defined types were placed in this encounter. Patient is here following a LAVH 1 week ago. Patient states she is doing well. She does complain that the stitches are bothering her some. She denies nausea, vomiting and fever.        Review of Systems week.    Belia Ganser, am scribing for, and in the presence of Dr. Shireen Nelson. Electronically signed by: Kyle Salas 11/5/20 2:11 PM       I agree to the above documentation placed by my scribe Kyle Salas. I reviewed the scribe's note and agree with the documented findings and plan of care. Any areas of disagreement are noted on the chart. I have personally evaluated this patient. Additional findings are as noted. I agree with the chief complaint, past medical history, past surgical history, allergies, medications, social and family history as documented unless otherwise noted below.      Electronically signed by Shireen Nelson MD on 11/6/2020 at 3:21 AM

## 2020-11-20 ENCOUNTER — OFFICE VISIT (OUTPATIENT)
Dept: OBGYN CLINIC | Age: 45
End: 2020-11-20

## 2020-11-20 VITALS
DIASTOLIC BLOOD PRESSURE: 90 MMHG | SYSTOLIC BLOOD PRESSURE: 120 MMHG | BODY MASS INDEX: 22.18 KG/M2 | HEIGHT: 63 IN | WEIGHT: 125.2 LBS | HEART RATE: 88 BPM

## 2020-11-20 PROCEDURE — 99024 POSTOP FOLLOW-UP VISIT: CPT | Performed by: SPECIALIST

## 2020-11-20 ASSESSMENT — PATIENT HEALTH QUESTIONNAIRE - PHQ9
SUM OF ALL RESPONSES TO PHQ9 QUESTIONS 1 & 2: 0
2. FEELING DOWN, DEPRESSED OR HOPELESS: 0
SUM OF ALL RESPONSES TO PHQ QUESTIONS 1-9: 0
SUM OF ALL RESPONSES TO PHQ QUESTIONS 1-9: 0
1. LITTLE INTEREST OR PLEASURE IN DOING THINGS: 0
SUM OF ALL RESPONSES TO PHQ QUESTIONS 1-9: 0

## 2020-11-25 ASSESSMENT — ENCOUNTER SYMPTOMS
COUGH: 0
ABDOMINAL DISTENTION: 0
DIARRHEA: 0
NAUSEA: 0
APNEA: 0
ABDOMINAL PAIN: 0
VOMITING: 0
CONSTIPATION: 0
EYE PAIN: 0

## 2020-11-25 NOTE — PROGRESS NOTES
Subjective:      Patient ID: Rachel Carroll is a 40 y.o. female. Chief Complaint   Patient presents with    Post-Op Check     no problems      BP (!) 120/90 (Site: Left Upper Arm, Position: Sitting, Cuff Size: Medium Adult)   Pulse 88   Ht 5' 3\" (1.6 m)   Wt 125 lb 3.2 oz (56.8 kg)   LMP 09/19/2020 (Exact Date)   BMI 22.18 kg/m²   Patient's last menstrual period was 09/19/2020 (exact date). [id number]    Past Medical History:   Diagnosis Date    Enlarged uterus     Fibroid uterus     Irregular bleeding     Non-English speaking patient     Bengali speaking    Pelvic pain      Current Outpatient Medications Ordered in Epic   Medication Sig Dispense Refill    ibuprofen (ADVIL;MOTRIN) 800 MG tablet Take 1 tablet by mouth every 8 hours (Patient not taking: Reported on 11/5/2020) 60 tablet 0    ondansetron (ZOFRAN-ODT) 4 MG disintegrating tablet Take 1 tablet by mouth every 8 hours as needed for Nausea or Vomiting (Patient not taking: Reported on 11/5/2020) 12 tablet 0    simethicone (MYLICON) 80 MG chewable tablet Take 1 tablet by mouth 4 times daily (Patient not taking: Reported on 11/20/2020) 40 tablet 0    sennosides-docusate sodium (SENOKOT-S) 8.6-50 MG tablet Take 1 tablet by mouth 2 times daily (Patient not taking: Reported on 11/5/2020) 60 tablet 0    ferrous sulfate (FE TABS) 325 (65 Fe) MG EC tablet Take 1 tablet by mouth daily (with breakfast) (Patient not taking: Reported on 11/5/2020) 30 tablet 0     No current Epic-ordered facility-administered medications on file. Problem List Items Addressed This Visit     Postoperative visit - Primary        No Known Allergies  No orders of the defined types were placed in this encounter. Patient is here following a LAVH 2 weeks ago. Patient states she is doing well. She denies nausea, vomiting and fever. Review of Systems   Constitutional: Negative for activity change, appetite change and fever.    HENT: Negative for ear discharge and ear pain. Eyes: Negative for pain and visual disturbance. Respiratory: Negative for apnea and cough. Cardiovascular: Negative for chest pain, palpitations and leg swelling. Gastrointestinal: Negative for abdominal distention, abdominal pain, constipation, diarrhea, nausea and vomiting. Endocrine: Negative. Genitourinary: Negative for difficulty urinating, dysuria, menstrual problem and pelvic pain. Musculoskeletal: Negative for neck pain and neck stiffness. Skin: Negative. Neurological: Negative for light-headedness and numbness. Hematological: Negative. Does not bruise/bleed easily. Objective:   Physical Exam  Vitals signs and nursing note reviewed. Constitutional:       Appearance: She is well-developed. HENT:      Head: Normocephalic and atraumatic. Neck:      Musculoskeletal: Normal range of motion and neck supple. Thyroid: No thyromegaly. Cardiovascular:      Rate and Rhythm: Normal rate and regular rhythm. Pulmonary:      Effort: Pulmonary effort is normal.      Breath sounds: Normal breath sounds. No wheezing. Abdominal:      General: Bowel sounds are normal. There is no distension. Palpations: Abdomen is soft. There is no mass. Tenderness: There is no abdominal tenderness. There is no guarding. Comments: Incision healing well without signs of infection    Musculoskeletal: Normal range of motion. Skin:     General: Skin is dry. Neurological:      Mental Status: She is alert and oriented to person, place, and time. Psychiatric:         Behavior: Behavior normal.         Thought Content: Thought content normal.         Assessment:      Patient 2 weeks post LAVH doing well. Patient told to schedule appointment in 4 weeks for pelvic exam.      Plan:      Appointment in 4 weeks. Urban Dow am scribing for, and in the presence of Dr. Richard Lott.    Electronically signed by: Genoveva Caicedo 11/25/20 9:37 AM       I agree to the above documentation placed by my scribe Ward Garber. I reviewed the scribe's note and agree with the documented findings and plan of care. Any areas of disagreement are noted on the chart. I have personally evaluated this patient. Additional findings are as noted. I agree with the chief complaint, past medical history, past surgical history, allergies, medications, social and family history as documented unless otherwise noted below.      Electronically signed by Ilene Zuniga MD on 11/27/2020 at 3:44 AM

## 2020-12-04 ENCOUNTER — OFFICE VISIT (OUTPATIENT)
Dept: OBGYN CLINIC | Age: 45
End: 2020-12-04

## 2020-12-04 VITALS
BODY MASS INDEX: 22.6 KG/M2 | TEMPERATURE: 97.3 F | DIASTOLIC BLOOD PRESSURE: 86 MMHG | HEART RATE: 83 BPM | WEIGHT: 127.6 LBS | RESPIRATION RATE: 16 BRPM | SYSTOLIC BLOOD PRESSURE: 136 MMHG

## 2020-12-04 PROCEDURE — 99024 POSTOP FOLLOW-UP VISIT: CPT | Performed by: SPECIALIST

## 2020-12-04 ASSESSMENT — ENCOUNTER SYMPTOMS
APNEA: 0
VOMITING: 0
ABDOMINAL PAIN: 0
EYE PAIN: 0
NAUSEA: 0
COUGH: 0
CONSTIPATION: 0
DIARRHEA: 0
ABDOMINAL DISTENTION: 0

## 2020-12-04 NOTE — PROGRESS NOTES
Subjective:      Patient ID: Rachel Carroll is a 40 y.o. female. Chief Complaint   Patient presents with    Follow-up     Patient is here for 3 week follow up LAVH. Patient complains of hot flashes     /86 (Site: Right Upper Arm, Position: Sitting)   Pulse 83   Temp 97.3 °F (36.3 °C) (Temporal)   Resp 16   Wt 127 lb 9.6 oz (57.9 kg)   LMP 09/19/2020 (Exact Date)   BMI 22.60 kg/m²   Patient's last menstrual period was 09/19/2020 (exact date). [id number]    Past Medical History:   Diagnosis Date    Enlarged uterus     Fibroid uterus     Irregular bleeding     Non-English speaking patient     Bulgarian speaking    Pelvic pain      Current Outpatient Medications Ordered in Epic   Medication Sig Dispense Refill    ibuprofen (ADVIL;MOTRIN) 800 MG tablet Take 1 tablet by mouth every 8 hours (Patient not taking: Reported on 11/5/2020) 60 tablet 0    ondansetron (ZOFRAN-ODT) 4 MG disintegrating tablet Take 1 tablet by mouth every 8 hours as needed for Nausea or Vomiting (Patient not taking: Reported on 11/5/2020) 12 tablet 0    simethicone (MYLICON) 80 MG chewable tablet Take 1 tablet by mouth 4 times daily (Patient not taking: Reported on 11/20/2020) 40 tablet 0    sennosides-docusate sodium (SENOKOT-S) 8.6-50 MG tablet Take 1 tablet by mouth 2 times daily (Patient not taking: Reported on 11/5/2020) 60 tablet 0    ferrous sulfate (FE TABS) 325 (65 Fe) MG EC tablet Take 1 tablet by mouth daily (with breakfast) (Patient not taking: Reported on 11/5/2020) 30 tablet 0     No current Epic-ordered facility-administered medications on file. Problem List Items Addressed This Visit     Postoperative visit - Primary        No Known Allergies  No orders of the defined types were placed in this encounter. Patient is here following LAVH and vulvar biopsy 5 weeks ago. Patient states she is doing well, but is having hot flashes.         Review of Systems   Constitutional: Negative for activity change, appetite change and fever. HENT: Negative for ear discharge and ear pain. Eyes: Negative for pain and visual disturbance. Respiratory: Negative for apnea and cough. Cardiovascular: Negative for chest pain, palpitations and leg swelling. Gastrointestinal: Negative for abdominal distention, abdominal pain, constipation, diarrhea, nausea and vomiting. Endocrine: Negative. Genitourinary: Negative for difficulty urinating, dysuria, menstrual problem and pelvic pain. Hot flashes   Musculoskeletal: Negative for neck pain and neck stiffness. Skin: Negative. Neurological: Negative for light-headedness and numbness. Hematological: Negative. Does not bruise/bleed easily. Objective:   Physical Exam  Vitals signs and nursing note reviewed. Exam conducted with a chaperone present. Constitutional:       Appearance: She is well-developed. HENT:      Head: Normocephalic and atraumatic. Neck:      Musculoskeletal: Normal range of motion and neck supple. Thyroid: No thyromegaly. Cardiovascular:      Rate and Rhythm: Normal rate and regular rhythm. Pulmonary:      Effort: Pulmonary effort is normal.      Breath sounds: Normal breath sounds. No wheezing. Abdominal:      General: Bowel sounds are normal. There is no distension. Palpations: Abdomen is soft. There is no mass. Tenderness: There is no abdominal tenderness. There is no guarding. Comments: Incision healing well without signs of infection    Genitourinary:     Comments: Excisional site healing well  Vaginal vault healing well  Musculoskeletal: Normal range of motion. Skin:     General: Skin is dry. Neurological:      Mental Status: She is alert and oriented to person, place, and time. Psychiatric:         Behavior: Behavior normal.         Thought Content: Thought content normal.         Assessment:      Patient 5 weeks post LAVH and vulvar biopsy doing well.   Patient advised she may resume normal activity. Patient was reassured that her hot flashes should subside within a few weeks however if her symptoms persist, she was told tor return for further testing. Plan:      Appointment in 1 year. Shasta El am scribing for, and in the presence of Dr. Sima Howell. Electronically signed by: Aurora Worley 12/4/20 10:58 AM       I agree to the above documentation placed by my scribe Aurora Worley. I reviewed the scribe's note and agree with the documented findings and plan of care. Any areas of disagreement are noted on the chart. I have personally evaluated this patient. Additional findings are as noted. I agree with the chief complaint, past medical history, past surgical history, allergies, medications, social and family history as documented unless otherwise noted below.      Electronically signed by Sima Howell MD on 12/5/2020 at 11:43 AM

## (undated) DEVICE — BLADE ES L6IN ELASTOMERIC COAT EXT DURABLE BEND UPTO 90DEG

## (undated) DEVICE — 3M™ IOBAN™ 2 ANTIMICROBIAL INCISE DRAPE 6650EZ: Brand: IOBAN™ 2

## (undated) DEVICE — Z INACTIVE USE 2527070 DRAPE SURG W40XL44IN UNDERBUTTOCK SMS POLYPR W/ PCH BK DISP

## (undated) DEVICE — PENCIL ES L3M BTTN SWCH HOLSTER W/ BLDE ELECTRD EDGE

## (undated) DEVICE — Device

## (undated) DEVICE — SUTURE VCRL + SZ 4-0 L27IN ABSRB WHT FS-2 3/8 CIR REV CUT VCP422H

## (undated) DEVICE — LEGGINGS, PAIR, CLEAR, STERILE: Brand: MEDLINE

## (undated) DEVICE — 4-PORT MANIFOLD: Brand: NEPTUNE 2

## (undated) DEVICE — TROCARS: Brand: KII® BLUNT TIP ACCESS SYSTEM

## (undated) DEVICE — GLOVE SURG 7 PF POLYMER COAT WHT STRL SIGN LTX ESSENTIAL LTX

## (undated) DEVICE — POUCH INSTR W6.75XL11.5IN FRST 2 PKT ADH FOR ORTH AND

## (undated) DEVICE — SUTURE VCRL + SZ 1 L36IN ABSRB UD L36MM CT-1 1/2 CIR VCP947H

## (undated) DEVICE — 3M™ STERI-STRIP™ WOUND CLOSURE SYSTEMS 5 EACH/PACK 25 PACKS/CARTON 4 CARTONS/CASE W8516: Brand: 3M™ STERI-STRIP™

## (undated) DEVICE — PLUMEPORT LAPAROSCOPIC SMOKE FILTRATION DEVICE: Brand: PLUMEPORT ACTIV

## (undated) DEVICE — PAD,SANITARY,11 IN,MAXI,W/WINGS,N-STRL: Brand: MEDLINE

## (undated) DEVICE — SOLUTION IV IRRIG WATER 1000ML POUR BRL 2F7114

## (undated) DEVICE — DRAPE,REIN 53X77,STERILE: Brand: MEDLINE

## (undated) DEVICE — TROCAR: Brand: KII® SLEEVE

## (undated) DEVICE — COUNTER NDL 10 COUNT HLD 20 FOAM BLK SGL MAG

## (undated) DEVICE — APPLICATOR SURG XL L38CM FOR ARISTA ABSRB HEMSTAT FLEXITIP

## (undated) DEVICE — SYRINGE MED 10ML LUERLOCK TIP W/O SFTY DISP

## (undated) DEVICE — DISSECTOR LAP DIA5MM BLNT TIP ENDOPATH

## (undated) DEVICE — SUTURE VCRL + SZ 0 L27IN ABSRB UD L36MM CT-1 1/2 CIR VCPP41D

## (undated) DEVICE — Y-TYPE TUR/BLADDER IRRIGATION SET, REGULATING CLAMP

## (undated) DEVICE — SINGLE-USE CUT/COAGULATION HANDPIECE FOR THE LAPAROSCOPIC SURGERY FOR THE PLASMAJET SYSTEM: Brand: PLASMAJET NEUTRAL PLASMA SURGERY SYSTEM

## (undated) DEVICE — AGENT HEMSTAT 5GM ARISTA AH

## (undated) DEVICE — TOTAL TRAY, 16FR 10ML SIL FOLEY, URN: Brand: MEDLINE

## (undated) DEVICE — COVER LT HNDL BLU PLAS

## (undated) DEVICE — TROCAR: Brand: KII FIOS FIRST ENTRY

## (undated) DEVICE — 40580 - THE PINK PAD - ADVANCED TRENDELENBURG POSITIONING KIT: Brand: 40580 - THE PINK PAD - ADVANCED TRENDELENBURG POSITIONING KIT

## (undated) DEVICE — GOWN,AURORA,NONREINFORCED,LARGE: Brand: MEDLINE

## (undated) DEVICE — ST CHARLES GYN LAPAROSCOPY PK: Brand: MEDLINE INDUSTRIES, INC.

## (undated) DEVICE — TUBING, SUCTION, 3/16" X 10', STRAIGHT: Brand: MEDLINE

## (undated) DEVICE — SCISSOR SURG CRV ENDOCUT TIP FOR LAP DISP

## (undated) DEVICE — KIT,ANTI FOG,W/SPONGE & FLUID,SOFT PACK: Brand: MEDLINE

## (undated) DEVICE — COVER,MAYO STAND,XL,STERILE: Brand: MEDLINE

## (undated) DEVICE — SOLUTION ANTIFOG VIS SYS CLEARIFY LAPSCP

## (undated) DEVICE — YANKAUER,BULB TIP,W/O VENT,RIGID,STERILE: Brand: MEDLINE

## (undated) DEVICE — MARKER,SKIN,WI/RULER AND LABELS: Brand: MEDLINE

## (undated) DEVICE — GAUZE,SPONGE,4"X4",16PLY,XRAY,STRL,LF: Brand: MEDLINE

## (undated) DEVICE — SUTURE PDS + SZ 0 L27IN ABSRB VLT L26MM CT 2 1 2 CIR PDP334H

## (undated) DEVICE — MERCY HEALTH ST CHARLES: Brand: MEDLINE INDUSTRIES, INC.

## (undated) DEVICE — SEALER LAP L37CM MARYLAND JAW OPN NANO COAT MULTIFUNCTIONAL